# Patient Record
Sex: MALE | Race: ASIAN | NOT HISPANIC OR LATINO | Employment: OTHER | URBAN - METROPOLITAN AREA
[De-identification: names, ages, dates, MRNs, and addresses within clinical notes are randomized per-mention and may not be internally consistent; named-entity substitution may affect disease eponyms.]

---

## 2017-09-25 ENCOUNTER — OFFICE VISIT (OUTPATIENT)
Dept: RHEUMATOLOGY | Facility: CLINIC | Age: 37
End: 2017-09-25
Payer: COMMERCIAL

## 2017-09-25 VITALS
DIASTOLIC BLOOD PRESSURE: 70 MMHG | SYSTOLIC BLOOD PRESSURE: 116 MMHG | WEIGHT: 159.31 LBS | BODY MASS INDEX: 22.86 KG/M2

## 2017-09-25 DIAGNOSIS — M45.5 ANKYLOSING SPONDYLITIS OF THORACOLUMBAR REGION: Primary | ICD-10-CM

## 2017-09-25 PROCEDURE — 99212 OFFICE O/P EST SF 10 MIN: CPT | Mod: ,,, | Performed by: INTERNAL MEDICINE

## 2017-09-25 PROCEDURE — 3008F BODY MASS INDEX DOCD: CPT | Mod: ,,, | Performed by: INTERNAL MEDICINE

## 2017-09-25 NOTE — PROGRESS NOTES
[facility] RHEUMATOLOGY           Follow-up visit      Subjective:       Patient ID:   NAME: Rosalinda Rojo : 1980     37 y.o. male    Referring Doc: No ref. provider found  Other Physicians:    Chief Complaint:  ankylosing spondylitis      HPI/Interval History:   The patient is doing well. He has no new complaints. He has recently run out of Enbrel but has not yet noted any resurgence of his prior symptoms.          ROS:   GEN: no fever, night sweats or weight loss  SKIN:  no rashes , erythema, bruising, or swelling, no Raynauds, no photosensitivity  HEENT: no HAs, no changes in vision, no mouth ulcers, no sicca symptoms, no scalp tenderness, jaw claudication.  CV: no CP, SOB, PND, PALACIOS or orthopnea,no palpitations  PULM: no SOB, cough, hemoptysis, sputum or pleuritic pain  GI: no abdominal pain, nausea, vomiting, constipation, diarrhea, melanotic stools, BRBPR, or hematemesis.no dysphagia  : no hematuria, dysuria  NEURO:no paresthesias, headaches, visual disturbances, muscle weakness  MUSCULOSKELETAL:  No complaints of joint swelling or pain. Some subtly increased neck stiffness  PSYCH: No insomnia, no significant depression, no anxiety    Medications:    Current Outpatient Prescriptions:     etanercept (ENBREL) 50 mg/mL (0.98 mL) Syrg injection, Inject 1 mL (50 mg total) into the skin once a week., Disp: 12 mL, Rfl: 3  FAMILY HISTORY: negative for Connective Tissue Disease        Review of patient's allergies indicates:  No Known Allergies          Objective:     Vitals:  Blood pressure 116/70, weight 72.3 kg (159 lb 4.8 oz).    Physical Examination:   GEN: wn/wd male in no apparent distress; AAOx3  SKIN: no rashes, no lesions, no sclerodactyly, no Raynaud's, no periungual erythema  HEAD: no alopecia, no scalp tenderness, no temporal artery tenderness or induration.  EYES: no pallor, no icterus, PERRLA  ENT:  no thrush, no mucosal dryness or ulcerations, adequate oral hygiene & dentition.  NECK: supple  x 6, no masses, no thyromegaly, no lymphadenopathy.  CV:   S1 and S2 regular, no murmurs, gallop or rubs  CHEST: Normal respiratory effort;  normal breath sounds/no adventitious sounds. No signs of consolidation.  ABD: non-tender and non-distended; soft; normal bowel sounds; no rebound/guarding or tenderness. No hepatosplenomegaly.  Musculoskeletal:  No evidence of inflammatory arthritis or any degenerative disease  EXTREM: no clubbing, cyanosis or edema. normal pulses.  NEURO: grossly intact; motor/sensory WNL; AAOx3; no tremors  PSYCH: normal mood, affect and behavior            Labs:   Lab Results   Component Value Date    WBC 4.72 07/25/2017    HGB 14.4 07/25/2017    HCT 44.4 07/25/2017    MCV 94 07/25/2017     07/25/2017   CMP@  Sodium   Date Value Ref Range Status   07/25/2017 139 136 - 145 mmol/L Final     Potassium   Date Value Ref Range Status   07/25/2017 4.1 3.5 - 5.1 mmol/L Final     Chloride   Date Value Ref Range Status   07/25/2017 102 95 - 110 mmol/L Final     CO2   Date Value Ref Range Status   07/25/2017 28 22 - 31 mmol/L Final     Glucose   Date Value Ref Range Status   07/25/2017 101 70 - 110 mg/dL Final     Comment:     The ADA recommends the following guidelines for fasting glucose:  Normal:       less than 100 mg/dL  Prediabetes:  100 mg/dL to 125 mg/dL  Diabetes:     126 mg/dL or higher       BUN, Bld   Date Value Ref Range Status   07/25/2017 23 (H) 9 - 21 mg/dL Final     Creatinine   Date Value Ref Range Status   07/25/2017 0.79 0.50 - 1.40 mg/dL Final     Calcium   Date Value Ref Range Status   07/25/2017 9.3 8.4 - 10.2 mg/dL Final     Total Protein   Date Value Ref Range Status   07/25/2017 7.3 6.0 - 8.4 g/dL Final     Albumin   Date Value Ref Range Status   07/25/2017 4.3 3.5 - 5.2 g/dL Final     Total Bilirubin   Date Value Ref Range Status   07/25/2017 0.9 0.2 - 1.3 mg/dL Final     Comment:     For infants and newborns, interpretation of results should be based  on gestational  age, weight and in agreement with clinical  observations.  Premature Infant recommended reference ranges:  Up to 24 hours.............<8.0 mg/dL  Up to 48 hours............<12.0 mg/dL  3-5 days..................<15.0 mg/dL  6-29 days.................<15.0 mg/dL       Alkaline Phosphatase   Date Value Ref Range Status   07/25/2017 39 38 - 145 U/L Final     AST   Date Value Ref Range Status   07/25/2017 22 17 - 59 U/L Final     ALT   Date Value Ref Range Status   07/25/2017 24 10 - 44 U/L Final         Radiology/Diagnostic Studies:    none    Assessment/Discussion/Plan:   37 y.o. male with ankylosing spondylitis-excellent control on Enbrel.        PLAN:   I will continue his medication without change. Labs will be obtained prior to the next visit.      RTC:   I will see him in 4 months.      Electronically signed by Bartolo Lara MD

## 2018-01-11 ENCOUNTER — OFFICE VISIT (OUTPATIENT)
Dept: RHEUMATOLOGY | Facility: CLINIC | Age: 38
End: 2018-01-11

## 2018-01-11 VITALS
DIASTOLIC BLOOD PRESSURE: 80 MMHG | WEIGHT: 162.88 LBS | SYSTOLIC BLOOD PRESSURE: 110 MMHG | BODY MASS INDEX: 23.32 KG/M2 | HEART RATE: 76 BPM | HEIGHT: 70 IN

## 2018-01-11 DIAGNOSIS — M75.51 SUBACROMIAL BURSITIS OF RIGHT SHOULDER JOINT: Primary | ICD-10-CM

## 2018-01-11 DIAGNOSIS — M45.6 ANKYLOSING SPONDYLITIS LUMBAR REGION: ICD-10-CM

## 2018-01-11 DIAGNOSIS — Z79.899 ENCOUNTER FOR LONG-TERM (CURRENT) DRUG USE: ICD-10-CM

## 2018-01-11 PROCEDURE — 99213 OFFICE O/P EST LOW 20 MIN: CPT | Mod: 25,,, | Performed by: INTERNAL MEDICINE

## 2018-01-11 PROCEDURE — 20610 DRAIN/INJ JOINT/BURSA W/O US: CPT | Mod: RT,,, | Performed by: INTERNAL MEDICINE

## 2018-01-11 RX ORDER — TRIAMCINOLONE ACETONIDE 40 MG/ML
40 INJECTION, SUSPENSION INTRA-ARTICULAR; INTRAMUSCULAR
Status: DISCONTINUED | OUTPATIENT
Start: 2018-01-11 | End: 2018-01-11 | Stop reason: HOSPADM

## 2018-01-11 RX ORDER — DEXAMETHASONE SODIUM PHOSPHATE 4 MG/ML
2 INJECTION, SOLUTION INTRA-ARTICULAR; INTRALESIONAL; INTRAMUSCULAR; INTRAVENOUS; SOFT TISSUE
Status: DISCONTINUED | OUTPATIENT
Start: 2018-01-11 | End: 2018-01-11 | Stop reason: HOSPADM

## 2018-01-11 RX ADMIN — DEXAMETHASONE SODIUM PHOSPHATE 2 MG: 4 INJECTION, SOLUTION INTRA-ARTICULAR; INTRALESIONAL; INTRAMUSCULAR; INTRAVENOUS; SOFT TISSUE at 04:01

## 2018-01-11 RX ADMIN — TRIAMCINOLONE ACETONIDE 40 MG: 40 INJECTION, SUSPENSION INTRA-ARTICULAR; INTRAMUSCULAR at 04:01

## 2018-01-11 NOTE — PROGRESS NOTES
"      [facility] RHEUMATOLOGY           Follow-up visit      Subjective:       Patient ID:   NAME: Rosalinda Rojo : 1980     37 y.o. male    Referring Doc: No ref. provider found  Other Physicians:    Chief Complaint:  Follow-up (follow up medications )      HPI/Interval History:    The patient has been doing reasonably well. He has had no problems with back pain or range of motion. He has had shoulder pain for the last couple of months. 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          ROS:   GEN:    no fever, night sweats or weight loss  SKIN:   no rashes , erythema, bruising, or swelling, no Raynauds, no photosensitivity  HEENT: no HAs, no changes in vision, no mouth ulcers, no sicca symptoms, no scalp tenderness, jaw claudication.  CV:      no CP, SOB, PND, PALACIOS or orthopnea,no palpitations  PULM: no SOB, cough, hemoptysis, sputum or pleuritic pain  GI:      no abdominal pain, nausea, vomiting, constipation, diarrhea, melanotic stools, BRBPR, or hematemesis, no dysphagia, no GERD  :     no hematuria, dysuria  NEURO: no paresthesias, headaches, visual disturbances  MUSCULOSKELETAL:  Shoulder pain and stiffness without warmth or swelling  PSYCH:   No insomnia, no significant depression, no anxiety    Medications:    Current Outpatient Prescriptions:     etanercept (ENBREL) 50 mg/mL (0.98 mL) Syrg injection, Inject 1 mL (50 mg total) into the skin once a week., Disp: 12 mL, Rfl: 3      FAMILY HISTORY: negative for Connective Tissue Disease        Review of patient's allergies indicates:  No Known Allergies          Objective:     Vitals:  Blood pressure 110/80, pulse 76, height 5' 10" (1.778 m), weight 73.9 kg (162 lb 14.4 oz).    Physical Examination:   GEN: wn/wd male in no apparent distress  SKIN: no rashes, no lesions, no sclerodactyly, no Raynaud's, no periungual erythema  HEAD: no alopecia, no scalp tenderness, no " temporal artery tenderness or induration.  EYES: no pallor, no icterus, PERRLA  ENT:  no thrush, no mucosal dryness or ulcerations, adequate oral hygiene & dentition.  NECK: supple x 6, no masses, no thyromegaly, no lymphadenopathy.  CV:   S1 and S2 regular, no murmurs, gallop or rubs  CHEST: Normal respiratory effort;  normal breath sounds/no adventitious sounds. No signs of consolidation.  ABD: non-tender and non-distended; soft; normal bowel sounds; no rebound/guarding or tenderness. No hepatosplenomegaly.  Musculoskeletal:  Tenderness overlying the right subacromial bursa. Range of motion is full. The drop arm sign is negative  EXTREM: no clubbing, cyanosis or edema. normal pulses.  NEURO: grossly intact; motor/sensory WNL; AAOx3; no tremors  PSYCH:  normal mood, affect and behavior            Labs:   Lab Results   Component Value Date    WBC 4.72 07/25/2017    HGB 14.4 07/25/2017    HCT 44.4 07/25/2017    MCV 94 07/25/2017     07/25/2017   CMP@  Sodium   Date Value Ref Range Status   07/25/2017 139 136 - 145 mmol/L Final     Potassium   Date Value Ref Range Status   07/25/2017 4.1 3.5 - 5.1 mmol/L Final     Chloride   Date Value Ref Range Status   07/25/2017 102 95 - 110 mmol/L Final     CO2   Date Value Ref Range Status   07/25/2017 28 22 - 31 mmol/L Final     Glucose   Date Value Ref Range Status   07/25/2017 101 70 - 110 mg/dL Final     Comment:     The ADA recommends the following guidelines for fasting glucose:  Normal:       less than 100 mg/dL  Prediabetes:  100 mg/dL to 125 mg/dL  Diabetes:     126 mg/dL or higher       BUN, Bld   Date Value Ref Range Status   07/25/2017 23 (H) 9 - 21 mg/dL Final     Creatinine   Date Value Ref Range Status   07/25/2017 0.79 0.50 - 1.40 mg/dL Final     Calcium   Date Value Ref Range Status   07/25/2017 9.3 8.4 - 10.2 mg/dL Final     Total Protein   Date Value Ref Range Status   07/25/2017 7.3 6.0 - 8.4 g/dL Final     Albumin   Date Value Ref Range Status    07/25/2017 4.3 3.5 - 5.2 g/dL Final     Total Bilirubin   Date Value Ref Range Status   07/25/2017 0.9 0.2 - 1.3 mg/dL Final     Comment:     For infants and newborns, interpretation of results should be based  on gestational age, weight and in agreement with clinical  observations.  Premature Infant recommended reference ranges:  Up to 24 hours.............<8.0 mg/dL  Up to 48 hours............<12.0 mg/dL  3-5 days..................<15.0 mg/dL  6-29 days.................<15.0 mg/dL       Alkaline Phosphatase   Date Value Ref Range Status   07/25/2017 39 38 - 145 U/L Final     AST   Date Value Ref Range Status   07/25/2017 22 17 - 59 U/L Final     ALT   Date Value Ref Range Status   07/25/2017 24 10 - 44 U/L Final         Radiology/Diagnostic Studies:    none    Assessment/Discussion/Plan:   37 y.o. male with ankylosing spondylitis-excellent response to Enbrel.  (2) RIGHT SUBACROMIAL BURSITIS    PLAN:  At the patient's request, the right shoulder was injected as described on the appended procedure note. He tolerated injection well and had pain-free range of motion immediately thereafter.  Routine blood testing was ordered. The patient will have it done in [place].    RTC:  I will see him back in 4 months.      Electronically signed by Bartolo Lara MD

## 2018-01-11 NOTE — PROCEDURES
Large Joint Aspiration/Injection  Date/Time: 1/11/2018 4:36 PM  Performed by: BREANNA MOREIRA  Authorized by: BREANNA MOREIRA     Consent Done?:  Yes (Verbal)  Indications:  Pain  Procedure site marked: Yes    Timeout: Prior to procedure the correct patient, procedure, and site was verified      Location:  Shoulder  Site:  R subacromial bursa  Prep: Patient was prepped and draped in usual sterile fashion    Ultrasonic Guidance for needle placement: No  Needle size:  22 G  Approach:  Lateral  Medications:  40 mg triamcinolone acetonide 40 mg/mL; 2 mg dexamethasone 4 mg/mL  Aspirate amount (ml):  0  Patient tolerance:  Patient tolerated the procedure well with no immediate complications

## 2018-05-10 ENCOUNTER — OFFICE VISIT (OUTPATIENT)
Dept: RHEUMATOLOGY | Facility: CLINIC | Age: 38
End: 2018-05-10

## 2018-05-10 VITALS — SYSTOLIC BLOOD PRESSURE: 98 MMHG | BODY MASS INDEX: 22.7 KG/M2 | DIASTOLIC BLOOD PRESSURE: 68 MMHG | WEIGHT: 158.19 LBS

## 2018-05-10 DIAGNOSIS — Z79.899 ENCOUNTER FOR LONG-TERM (CURRENT) DRUG USE: ICD-10-CM

## 2018-05-10 DIAGNOSIS — M45.6 ANKYLOSING SPONDYLITIS LUMBAR REGION: ICD-10-CM

## 2018-05-10 DIAGNOSIS — M75.51 SUBACROMIAL BURSITIS OF RIGHT SHOULDER JOINT: Primary | ICD-10-CM

## 2018-05-10 PROCEDURE — 99213 OFFICE O/P EST LOW 20 MIN: CPT | Mod: ,,, | Performed by: INTERNAL MEDICINE

## 2018-05-10 NOTE — PROGRESS NOTES
[facility] RHEUMATOLOGY           Follow-up visit      Subjective:       Patient ID:   NAME: Rosalinda Rojo : 1980     37 y.o. male    Referring Doc: No ref. provider found  Other Physicians:    Chief Complaint:  Ankylosing spondylitis (Takes Enbrel Inj q weekly)      HPI/Interval History:   The patient is having no problem with back pain or stiffness. He has no peripheral joint swelling. He continues however to have very significant shoulder pain. He has been evaluated by orthopedics and [facility] on 2 occasions. Plain films were performed. No definitive diagnosis has been offered. Cortisone injections were not helpful. 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  CV:      no CP, SOB, PND, PALACIOS or orthopnea,no palpitations  PULM: no SOB, cough, hemoptysis, sputum or pleuritic pain  GI:      no abdominal pain, nausea, vomiting, constipation, diarrhea, melanotic stools, BRBPR, or hematemesis, no dysphagia, no GERD  :     no hematuria, dysuria  NEURO: no paresthesias, headaches, visual disturbances  MUSCULOSKELETAL:  Bilateral shoulder pain as above  PSYCH:   No insomnia, no significant depression, no anxiety    Medications:    Current Outpatient Prescriptions:     etanercept (ENBREL) 50 mg/mL (0.98 mL) Syrg injection, Inject 1 mL (50 mg total) into the skin once a week., Disp: 12 mL, Rfl: 3      FAMILY HISTORY: negative for Connective Tissue Disease        Review of patient's allergies indicates:  No Known Allergies          Objective:     Vitals:  Blood pressure 98/68, weight 71.8 kg (158 lb 3.2 oz).    Physical Examination:   GEN: wn/wd male in no  apparent distress  SKIN: no rashes, no lesions, no sclerodactyly, no Raynaud's, no periungual erythema  HEAD: no alopecia, no scalp tenderness, no temporal artery tenderness or induration.  EYES: no pallor, no icterus, PERRLA  ENT:  no thrush, no mucosal dryness or ulcerations, adequate oral hygiene & dentition.  NECK: supple x 6, no masses, no thyromegaly, no lymphadenopathy.  CV:   S1 and S2 regular, no murmurs, gallop or rubs  CHEST: Normal respiratory effort;  normal breath sounds/no adventitious sounds. No signs of consolidation.  ABD: non-tender and non-distended; soft; normal bowel sounds; no rebound/guarding or tenderness. No hepatosplenomegaly.  Musculoskeletal:  Crepitus throughout the arc of motion in both shoulders without any evidence of instability or inflammation. Range of motion is full. The impingement sign is negative  EXTREM: no clubbing, cyanosis or edema. normal pulses.  NEURO: grossly intact; motor/sensory WNL; AAOx3; no tremors  PSYCH:  normal mood, affect and behavior            Labs:   Lab Results   Component Value Date    WBC 4.72 07/25/2017    HGB 14.4 07/25/2017    HCT 44.4 07/25/2017    MCV 94 07/25/2017     07/25/2017   CMP@  Sodium   Date Value Ref Range Status   07/25/2017 139 136 - 145 mmol/L Final     Potassium   Date Value Ref Range Status   07/25/2017 4.1 3.5 - 5.1 mmol/L Final     Chloride   Date Value Ref Range Status   07/25/2017 102 95 - 110 mmol/L Final     CO2   Date Value Ref Range Status   07/25/2017 28 22 - 31 mmol/L Final     Glucose   Date Value Ref Range Status   07/25/2017 101 70 - 110 mg/dL Final     Comment:     The ADA recommends the following guidelines for fasting glucose:  Normal:       less than 100 mg/dL  Prediabetes:  100 mg/dL to 125 mg/dL  Diabetes:     126 mg/dL or higher       BUN, Bld   Date Value Ref Range Status   07/25/2017 23 (H) 9 - 21 mg/dL Final     Creatinine   Date Value Ref Range Status   07/25/2017 0.79 0.50 - 1.40 mg/dL Final      Calcium   Date Value Ref Range Status   07/25/2017 9.3 8.4 - 10.2 mg/dL Final     Total Protein   Date Value Ref Range Status   07/25/2017 7.3 6.0 - 8.4 g/dL Final     Albumin   Date Value Ref Range Status   07/25/2017 4.3 3.5 - 5.2 g/dL Final     Total Bilirubin   Date Value Ref Range Status   07/25/2017 0.9 0.2 - 1.3 mg/dL Final     Comment:     For infants and newborns, interpretation of results should be based  on gestational age, weight and in agreement with clinical  observations.  Premature Infant recommended reference ranges:  Up to 24 hours.............<8.0 mg/dL  Up to 48 hours............<12.0 mg/dL  3-5 days..................<15.0 mg/dL  6-29 days.................<15.0 mg/dL       Alkaline Phosphatase   Date Value Ref Range Status   07/25/2017 39 38 - 145 U/L Final     AST   Date Value Ref Range Status   07/25/2017 22 17 - 59 U/L Final     ALT   Date Value Ref Range Status   07/25/2017 24 10 - 44 U/L Final         Radiology/Diagnostic Studies:    none    Assessment/Discussion/Plan:   37 y.o. male with ankylosing spondylitis with significant shoulder pain bilaterally not likely related to the primary diagnosis      PLAN:  I will continue his Enbrel without change. I have discussed the working diagnosis of internal derangement of the shoulders with him. It is my opinion that the next step should be obtaining an MRI of the shoulders. He is in agreement and will actively seek out the best price possible. I will write the order for him and then we will reconvene and discuss the options.He understands that the options may be limited to surgery.      RTC:  I will schedule his regular appointment in 6 months.      Electronically signed by Bartolo Lara MD

## 2018-11-08 ENCOUNTER — OFFICE VISIT (OUTPATIENT)
Dept: RHEUMATOLOGY | Facility: CLINIC | Age: 38
End: 2018-11-08

## 2018-11-08 VITALS
WEIGHT: 168 LBS | HEART RATE: 75 BPM | BODY MASS INDEX: 24.11 KG/M2 | DIASTOLIC BLOOD PRESSURE: 65 MMHG | SYSTOLIC BLOOD PRESSURE: 109 MMHG

## 2018-11-08 DIAGNOSIS — M45.6 ANKYLOSING SPONDYLITIS LUMBAR REGION: Primary | ICD-10-CM

## 2018-11-08 DIAGNOSIS — Z79.899 ENCOUNTER FOR LONG-TERM (CURRENT) DRUG USE: ICD-10-CM

## 2018-11-08 PROCEDURE — 99213 OFFICE O/P EST LOW 20 MIN: CPT | Mod: ,,, | Performed by: INTERNAL MEDICINE

## 2018-11-08 NOTE — PROGRESS NOTES
[facility] RHEUMATOLOGY           Follow-up visit    Notes dictated via Dragon to EPIC. Please forgive any unintentional errors.  Subjective:       Patient ID:   NAME: Rosalinda Rojo : 1980     38 y.o. male    Referring Doc: No ref. provider found  Other Physicians:    Chief Complaint:  Ankylosing spondylitis      HPI/Interval History:  The patient is doing well. He has no progressive back stiffness or pain. He has not noted any decreased range of motion. He continues to have bilateral shoulder pain and has been told by orthopedics that he needs rotator cuff repair.          ROS:   GEN:    no fever, night sweats or weight loss  SKIN:   no rashes , erythema, bruising, or swelling, no Raynauds, no photosensitivity  HEENT: no changes in vision, no mouth ulcers, no sicca symptoms, no scalp tenderness, no jaw claudication.  CV:      no CP, PND, PALACIOS or orthopnea, no palpitations  PULM: no SOB, cough, hemoptysis, sputum or pleuritic pain  GI:       no abdominal pain, nausea, vomiting, constipation, diarrhea, melanotic stools, BRBPR, or hematemesis, no dysphagia, no GERD  :     no hematuria, dysuria  NEURO: no paresthesias, headaches, acute visual disturbances  MUSCULOSKELETAL:  No back pain or stiffness. No complaints of peripheral joint inflammation  PSYCH:   No insomnia, no significant anxiety or depression    Medications:    Current Outpatient Medications:     etanercept (ENBREL) 50 mg/mL (0.98 mL) Syrg injection, Inject 1 mL (50 mg total) into the skin once a week., Disp: 12 mL, Rfl: 3      FAMILY HISTORY: negative for Connective Tissue Disease        Review of patient's allergies indicates:  No Known Allergies          Objective:     Vitals:  Blood pressure 109/65, pulse 75, weight 76.2 kg (168 lb).    Physical Examination:   GEN: wn/wd male in no apparent distress  SKIN: no rashes, no lesions, no sclerodactyly, no Raynaud's, no periungual erythema  HEAD: no alopecia, no scalp tenderness, no temporal artery  tenderness or induration.  EYES: no pallor, no icterus, PERRLA  ENT:  no thrush, no mucosal dryness or ulcerations, adequate oral hygiene & dentition.  NECK: supple x 6, no masses, no thyromegaly, no lymphadenopathy.  CV:   S1 and S2 regular, no murmurs, gallop or rubs  CHEST: Normal respiratory effort;  normal breath sounds/no adventitious sounds. No signs of consolidation.  ABD: non-tender and non-distended; soft; normal bowel sounds; no rebound/guarding or tenderness. No hepatosplenomegaly.  Musculoskeletal:  No evidence of active inflammatory disease. Some weakness is noted on abduction of the right shoulder but active range of motion is full and the drop arm and impingement signs are negative  EXTREM: no clubbing, cyanosis or edema. normal pulses.  NEURO: grossly intact; motor/sensory WNL; no tremors  PSYCH:  normal mood, affect and behavior            Labs:   Lab Results   Component Value Date    WBC 4.72 07/25/2017    HGB 14.4 07/25/2017    HCT 44.4 07/25/2017    MCV 94 07/25/2017     07/25/2017   CMP@  Sodium   Date Value Ref Range Status   07/25/2017 139 136 - 145 mmol/L Final     Potassium   Date Value Ref Range Status   07/25/2017 4.1 3.5 - 5.1 mmol/L Final     Chloride   Date Value Ref Range Status   07/25/2017 102 95 - 110 mmol/L Final     CO2   Date Value Ref Range Status   07/25/2017 28 22 - 31 mmol/L Final     Glucose   Date Value Ref Range Status   07/25/2017 101 70 - 110 mg/dL Final     Comment:     The ADA recommends the following guidelines for fasting glucose:  Normal:       less than 100 mg/dL  Prediabetes:  100 mg/dL to 125 mg/dL  Diabetes:     126 mg/dL or higher       BUN, Bld   Date Value Ref Range Status   07/25/2017 23 (H) 9 - 21 mg/dL Final     Creatinine   Date Value Ref Range Status   07/25/2017 0.79 0.50 - 1.40 mg/dL Final     Calcium   Date Value Ref Range Status   07/25/2017 9.3 8.4 - 10.2 mg/dL Final     Total Protein   Date Value Ref Range Status   07/25/2017 7.3 6.0 - 8.4  g/dL Final     Albumin   Date Value Ref Range Status   07/25/2017 4.3 3.5 - 5.2 g/dL Final     Total Bilirubin   Date Value Ref Range Status   07/25/2017 0.9 0.2 - 1.3 mg/dL Final     Comment:     For infants and newborns, interpretation of results should be based  on gestational age, weight and in agreement with clinical  observations.  Premature Infant recommended reference ranges:  Up to 24 hours.............<8.0 mg/dL  Up to 48 hours............<12.0 mg/dL  3-5 days..................<15.0 mg/dL  6-29 days.................<15.0 mg/dL       Alkaline Phosphatase   Date Value Ref Range Status   07/25/2017 39 38 - 145 U/L Final     AST   Date Value Ref Range Status   07/25/2017 22 17 - 59 U/L Final     ALT   Date Value Ref Range Status   07/25/2017 24 10 - 44 U/L Final         Radiology/Diagnostic Studies:    none    Assessment/Discussion/Plan:   38 y.o. male with ankylosing spondylitis-excellent response to Enbrel      PLAN:  I will continue his medication without change. Blood testing was ordered and will be obtained in [place] prior to the end of this year.      RTC:  I will see him back in 4 months      Electronically signed by Bartolo Lara MD

## 2019-03-19 ENCOUNTER — OFFICE VISIT (OUTPATIENT)
Dept: RHEUMATOLOGY | Facility: CLINIC | Age: 39
End: 2019-03-19

## 2019-03-19 VITALS
WEIGHT: 161.88 LBS | SYSTOLIC BLOOD PRESSURE: 109 MMHG | DIASTOLIC BLOOD PRESSURE: 67 MMHG | BODY MASS INDEX: 23.23 KG/M2

## 2019-03-19 DIAGNOSIS — M45.9 ANKYLOSING SPONDYLITIS, UNSPECIFIED SITE OF SPINE: Primary | ICD-10-CM

## 2019-03-19 PROCEDURE — 99213 OFFICE O/P EST LOW 20 MIN: CPT | Mod: ,,, | Performed by: INTERNAL MEDICINE

## 2019-03-19 PROCEDURE — 99213 PR OFFICE/OUTPT VISIT, EST, LEVL III, 20-29 MIN: ICD-10-PCS | Mod: ,,, | Performed by: INTERNAL MEDICINE

## 2019-03-19 NOTE — PROGRESS NOTES
[facility]HEUMATOLOG[facility]           Follow-up visit    Notes dictated via Dragon to EPIC. Please forgive any unintended errors.  Subjective:       Patient ID:   NAME: Rosalinda Rojo : 1980     38 y.o. male    Referring Doc: No ref. provider found  Other Physicians:    Chief Complaint:  Ankylosing spondylitis lumbar region      HPI/Interval History:   the patient has been doing well. He has had no problems with back pain or stiffness. He is working 7 days a week and tolerating being on his feet almost all day long.          ROS:   GEN:    no fever, night sweats or weight loss  SKIN:   no rashes , erythema, bruising, or swelling, no Raynauds, no photosensitivity  HEENT: no changes in vision, no mouth ulcers, no sicca symptoms, no scalp tenderness, no jaw claudication.  CV:      no CP, PND, PALACIOS or orthopnea, no palpitations  PULM: no SOB, cough, hemoptysis, sputum or pleuritic pain  GI:       no GERD, no dysphagia, no abdominal pain, nausea, vomiting, constipation, diarrhea, melanotic stools, BRBPR, or hematemesis  :      no hematuria, dysuria  NEURO: no paresthesias, headaches, acute visual disturbances  MUSCULOSKELETAL:  No muscle or joint complaints  PSYCH:   No insomnia, no increased anxiety or depression    Medications:    Current Outpatient Medications:     etanercept (ENBREL) 50 mg/mL (0.98 mL) Syrg injection, Inject 1 mL (50 mg total) into the skin once a week., Disp: 12 mL, Rfl: 3      FAMILY HISTORY: negative for Connective Tissue Disease        Review of patient's allergies indicates:  No Known Allergies          Objective:     Vitals:  Blood pressure 109/67, weight 73.4 kg (161 lb 14.4 oz).    Physical Examination:   GEN: wn/wd male in no apparent distress  SKIN: no rashes, no sclerodactyly, no Raynaud's, no periungual erythema, no digital tip ulcerations, no nailbed pitting  HEAD: no alopecia, no scalp tenderness, no temporal artery tenderness or induration.  EYES: no pallor, no icterus,  PERRLA  ENT:  no thrush, no mucosal dryness or ulcerations, adequate oral hygiene & dentition.  NECK: supple x 6, no masses, no thyromegaly, no lymphadenopathy.  CV:   S1 and S2 regular, no murmurs, gallop or rubs  CHEST: Normal respiratory effort;  normal breath sounds/no adventitious sounds. No signs of consolidation.  ABD: non-tender and non-distended; soft; normal bowel sounds; no rebound/guarding or tenderness. No hepatosplenomegaly.  Musculoskeletal:  No decrease in range of motion of the back. No signs of peripheral arthritis  EXTREM: no clubbing, cyanosis or edema. normal pulses.  NEURO:  grossly intact; motor/sensory WNL; no tremors  PSYCH:  normal mood, affect and behavior            Labs:   Lab Results   Component Value Date    WBC 4.72 07/25/2017    HGB 14.4 07/25/2017    HCT 44.4 07/25/2017    MCV 94 07/25/2017     07/25/2017   CMP@  Sodium   Date Value Ref Range Status   07/25/2017 139 136 - 145 mmol/L Final     Potassium   Date Value Ref Range Status   07/25/2017 4.1 3.5 - 5.1 mmol/L Final     Chloride   Date Value Ref Range Status   07/25/2017 102 95 - 110 mmol/L Final     CO2   Date Value Ref Range Status   07/25/2017 28 22 - 31 mmol/L Final     Glucose   Date Value Ref Range Status   07/25/2017 101 70 - 110 mg/dL Final     Comment:     The ADA recommends the following guidelines for fasting glucose:  Normal:       less than 100 mg/dL  Prediabetes:  100 mg/dL to 125 mg/dL  Diabetes:     126 mg/dL or higher       BUN, Bld   Date Value Ref Range Status   07/25/2017 23 (H) 9 - 21 mg/dL Final     Creatinine   Date Value Ref Range Status   07/25/2017 0.79 0.50 - 1.40 mg/dL Final     Calcium   Date Value Ref Range Status   07/25/2017 9.3 8.4 - 10.2 mg/dL Final     Total Protein   Date Value Ref Range Status   07/25/2017 7.3 6.0 - 8.4 g/dL Final     Albumin   Date Value Ref Range Status   07/25/2017 4.3 3.5 - 5.2 g/dL Final     Total Bilirubin   Date Value Ref Range Status   07/25/2017 0.9 0.2 - 1.3  mg/dL Final     Comment:     For infants and newborns, interpretation of results should be based  on gestational age, weight and in agreement with clinical  observations.  Premature Infant recommended reference ranges:  Up to 24 hours.............<8.0 mg/dL  Up to 48 hours............<12.0 mg/dL  3-5 days..................<15.0 mg/dL  6-29 days.................<15.0 mg/dL       Alkaline Phosphatase   Date Value Ref Range Status   07/25/2017 39 38 - 145 U/L Final     AST   Date Value Ref Range Status   07/25/2017 22 17 - 59 U/L Final     ALT   Date Value Ref Range Status   07/25/2017 24 10 - 44 U/L Final         Radiology/Diagnostic Studies:    None    Assessment/Discussion/Plan:   38 y.o. male with ankylosing spondylitis-excellent control on Enbrel      PLAN:  I will continue his medication unchanged. Blood testing from November 2018 was reviewed.      RTC: I will see him back in 6 months with new labs      Electronically signed by Bartolo Lara MD

## 2019-10-03 ENCOUNTER — OFFICE VISIT (OUTPATIENT)
Dept: RHEUMATOLOGY | Facility: CLINIC | Age: 39
End: 2019-10-03

## 2019-10-03 VITALS
SYSTOLIC BLOOD PRESSURE: 114 MMHG | DIASTOLIC BLOOD PRESSURE: 74 MMHG | WEIGHT: 164.19 LBS | BODY MASS INDEX: 23.56 KG/M2

## 2019-10-03 DIAGNOSIS — Z79.899 ENCOUNTER FOR LONG-TERM (CURRENT) DRUG USE: ICD-10-CM

## 2019-10-03 DIAGNOSIS — M45.9 ANKYLOSING SPONDYLITIS, UNSPECIFIED SITE OF SPINE: Primary | ICD-10-CM

## 2019-10-03 PROCEDURE — 99213 OFFICE O/P EST LOW 20 MIN: CPT | Mod: S$GLB,,, | Performed by: INTERNAL MEDICINE

## 2019-10-03 PROCEDURE — 99213 PR OFFICE/OUTPT VISIT, EST, LEVL III, 20-29 MIN: ICD-10-PCS | Mod: S$GLB,,, | Performed by: INTERNAL MEDICINE

## 2019-10-03 NOTE — PROGRESS NOTES
[facility]HEUMATOLOG[facility]           Follow-up visit    Notes dictated to M*Modal. Please forgive any unintended errors.  Subjective:       Patient ID:   NAME: Rosalinda Rojo : 1980     39 y.o. male    Referring Doc: No ref. provider found  Other Physicians:    Chief Complaint:  Ankylosing spondylitis      HPI/Interval History:  The patient is doing well.  He has no complaints of back pain or of joint swelling.  He has no morning stiffness.    ROS:   GEN:    no fever, night sweats or weight loss  SKIN:   no rashes, erythema, bruising, or swelling, no Raynauds, no photosensitivity  HEENT: no changes in vision, no mouth ulcers, no sicca symptoms, no scalp tenderness, no jaw claudication.  CV:      no CP, PND, PALACIOS, orthopnea, no palpitations  PULM: no SOB, cough, hemoptysis, sputum or pleuritic pain  GI:       no GERD, no dysphagia, no hematemesis, no abdominal pain, nausea, vomiting, constipation, diarrhea, melanotic stools, BRBPR  :      no hematuria, dysuria  NEURO: no paresthesias, headaches, acute visual disturbances  MUSCULOSKELETAL:  No red, hot, and/or swollen joints  PSYCH:   No increased insomnia, no increased anxiety, no increased depression    Past Medical/Surgical History:  Past Medical History:   Diagnosis Date    Ankylosing spondylitis lumbar region     Ankylosing spondylitis lumbar region     Back pain     Bilateral Shoulder Strain     Wellness Visit 17      History reviewed. No pertinent surgical history.    Allergies:  Review of patient's allergies indicates:  No Known Allergies    Social/Family History:  Social History     Socioeconomic History    Marital status:      Spouse name: Not on file    Number of children: Not on file    Years of education: Not on file    Highest education level: Not on file   Occupational History    Not on file   Social Needs    Financial resource strain: Not on file    Food insecurity:     Worry: Not on file     Inability: Not on file     Transportation needs:     Medical: Not on file     Non-medical: Not on file   Tobacco Use    Smoking status: Never Smoker    Smokeless tobacco: Never Used   Substance and Sexual Activity    Alcohol use: No    Drug use: No    Sexual activity: Yes     Partners: Female   Lifestyle    Physical activity:     Days per week: Not on file     Minutes per session: Not on file    Stress: Not on file   Relationships    Social connections:     Talks on phone: Not on file     Gets together: Not on file     Attends Yazidi service: Not on file     Active member of club or organization: Not on file     Attends meetings of clubs or organizations: Not on file     Relationship status: Not on file   Other Topics Concern    Not on file   Social History Narrative    Not on file     Family History   Problem Relation Age of Onset    Heart disease Father      FAMILY HISTORY: negative for Connective Tissue Disease      Medications:    Current Outpatient Medications:     etanercept (ENBREL) 50 mg/mL (0.98 mL) Syrg injection, Inject 1 mL (50 mg total) into the skin once a week., Disp: 12 mL, Rfl: 3    mupirocin (BACTROBAN) 2 % ointment, Apply topically 3 (three) times daily. (Patient not taking: Reported on 10/3/2019), Disp: 1 Tube, Rfl: 0      Objective:     Vitals:  Blood pressure 114/74, weight 74.5 kg (164 lb 3.2 oz).    Physical Examination:   GEN: wn/wd male in no apparent distress  SKIN: no rashes, no sclerodactyly, no Raynaud's, no periungual erythema, no digital tip ulcerations, no nailbed pitting  HEAD: no alopecia, no scalp tenderness, no temporal artery tenderness or induration.  EYES: no pallor, no icterus, PERRLA  ENT:  no thrush, no mucosal dryness or ulcerations, adequate oral hygiene & dentition.  NECK: supple x 6, no masses, no thyromegaly, no lymphadenopathy.  CV:   S1 and S2 regular, no murmurs, gallop or rubs  CHEST: Normal respiratory effort;  normal breath sounds/no adventitious sounds. No signs of  consolidation.  ABD: non-tender and non-distended; soft; normal bowel sounds; no rebound/guarding or tenderness. No hepatosplenomegaly.  Musculoskeletal:  No evidence of active synovitis.  No progressive deformity.  No decrease in range of motion of the back  EXTREM: no clubbing, cyanosis or edema. normal pulses.  NEURO:  grossly intact; motor/sensory WNL; no tremors  PSYCH:  normal mood, affect and behavior    Labs:   Lab Results   Component Value Date    WBC 4.72 07/25/2017    HGB 14.4 07/25/2017    HCT 44.4 07/25/2017    MCV 94 07/25/2017     07/25/2017   CMP@  Sodium   Date Value Ref Range Status   07/25/2017 139 136 - 145 mmol/L Final     Potassium   Date Value Ref Range Status   07/25/2017 4.1 3.5 - 5.1 mmol/L Final     Chloride   Date Value Ref Range Status   07/25/2017 102 95 - 110 mmol/L Final     CO2   Date Value Ref Range Status   07/25/2017 28 22 - 31 mmol/L Final     Glucose   Date Value Ref Range Status   07/25/2017 101 70 - 110 mg/dL Final     Comment:     The ADA recommends the following guidelines for fasting glucose:  Normal:       less than 100 mg/dL  Prediabetes:  100 mg/dL to 125 mg/dL  Diabetes:     126 mg/dL or higher       BUN, Bld   Date Value Ref Range Status   07/25/2017 23 (H) 9 - 21 mg/dL Final     Creatinine   Date Value Ref Range Status   07/25/2017 0.79 0.50 - 1.40 mg/dL Final     Calcium   Date Value Ref Range Status   07/25/2017 9.3 8.4 - 10.2 mg/dL Final     Total Protein   Date Value Ref Range Status   07/25/2017 7.3 6.0 - 8.4 g/dL Final     Albumin   Date Value Ref Range Status   07/25/2017 4.3 3.5 - 5.2 g/dL Final     Total Bilirubin   Date Value Ref Range Status   07/25/2017 0.9 0.2 - 1.3 mg/dL Final     Comment:     For infants and newborns, interpretation of results should be based  on gestational age, weight and in agreement with clinical  observations.  Premature Infant recommended reference ranges:  Up to 24 hours.............<8.0 mg/dL  Up to 48  hours............<12.0 mg/dL  3-5 days..................<15.0 mg/dL  6-29 days.................<15.0 mg/dL       Alkaline Phosphatase   Date Value Ref Range Status   07/25/2017 39 38 - 145 U/L Final     AST   Date Value Ref Range Status   07/25/2017 22 17 - 59 U/L Final     ALT   Date Value Ref Range Status   07/25/2017 24 10 - 44 U/L Final       Radiology/Diagnostic Studies:    None    Assessment/Discussion/Plan:   39 y.o. male with ankylosing spondylitis-good response to Enbrel    PLAN:  I will continue his medication unchanged.  Routine blood testing was ordered.    RTC:  I will see him back in 6 months    Electronically signed by Bartolo Lara MD

## 2020-05-11 ENCOUNTER — OFFICE VISIT (OUTPATIENT)
Dept: RHEUMATOLOGY | Facility: CLINIC | Age: 40
End: 2020-05-11

## 2020-05-11 VITALS
TEMPERATURE: 98 F | DIASTOLIC BLOOD PRESSURE: 67 MMHG | WEIGHT: 164.19 LBS | SYSTOLIC BLOOD PRESSURE: 104 MMHG | BODY MASS INDEX: 23.56 KG/M2

## 2020-05-11 DIAGNOSIS — Z79.899 ENCOUNTER FOR LONG-TERM (CURRENT) DRUG USE: ICD-10-CM

## 2020-05-11 DIAGNOSIS — M45.9 ANKYLOSING SPONDYLITIS, UNSPECIFIED SITE OF SPINE: Primary | ICD-10-CM

## 2020-05-11 PROCEDURE — 99213 OFFICE O/P EST LOW 20 MIN: CPT | Mod: S$GLB,,, | Performed by: INTERNAL MEDICINE

## 2020-05-11 PROCEDURE — 99213 PR OFFICE/OUTPT VISIT, EST, LEVL III, 20-29 MIN: ICD-10-PCS | Mod: S$GLB,,, | Performed by: INTERNAL MEDICINE

## 2020-05-11 NOTE — PROGRESS NOTES
[facility] RHEUMATOLOGY           Follow-up visit    Notes dictated to M*Modal. Please forgive any unintended errors.  Subjective:       Patient ID:   NAME: Rosalinda Rjoo : 1980     39 y.o. male    Referring Doc: No ref. provider found  Other Physicians:    Chief Complaint:  Ankylosing spondylitis      HPI/Interval History:  The patient is doing well.  He has minimal back stiffness each morning.  He has not been as diligent with his range of motion exercises during the pandemic.  He has not had any other peripheral signs or symptoms of his spondyloarthropathy.    ROS:   GEN:    no fever, night sweats or weight loss  SKIN:   no rashes, erythema, bruising, or swelling, no Raynauds, no photosensitivity  HEENT: no changes in vision, no mouth ulcers, no sicca symptoms, no scalp tenderness, no jaw claudication.  CV:      no CP, PND, PALACIOS, orthopnea, no palpitations  PULM: no SOB, cough, hemoptysis, sputum or pleuritic pain  GI:       no GERD, no dysphagia, no hematemesis, no abdominal pain, nausea, vomiting, constipation, diarrhea, melanotic stools, BRBPR  :      no hematuria, dysuria  NEURO: no paresthesias, headaches, acute visual disturbances  MUSCULOSKELETAL:  No red, hot, and/or swollen joints   PSYCH:   No increased insomnia, no increased anxiety, no increased depression    Past Medical/Surgical History:  Past Medical History:   Diagnosis Date    Ankylosing spondylitis lumbar region     Ankylosing spondylitis lumbar region     Back pain     Bilateral Shoulder Strain     Wellness Visit 17      History reviewed. No pertinent surgical history.    Allergies:  Review of patient's allergies indicates:  No Known Allergies    Social/Family History:  Social History     Socioeconomic History    Marital status:      Spouse name: Not on file    Number of children: Not on file    Years of education: Not on file    Highest education level: Not on file   Occupational History    Not on file   Social  Needs    Financial resource strain: Not on file    Food insecurity:     Worry: Not on file     Inability: Not on file    Transportation needs:     Medical: Not on file     Non-medical: Not on file   Tobacco Use    Smoking status: Never Smoker    Smokeless tobacco: Never Used   Substance and Sexual Activity    Alcohol use: No    Drug use: No    Sexual activity: Yes     Partners: Female   Lifestyle    Physical activity:     Days per week: Not on file     Minutes per session: Not on file    Stress: Not on file   Relationships    Social connections:     Talks on phone: Not on file     Gets together: Not on file     Attends [employer] service: Not on file     Active member of club or organization: Not on file     Attends meetings of clubs or organizations: Not on file     Relationship status: Not on file   Other Topics Concern    Not on file   Social History Narrative    Not on file     Family History   Problem Relation Age of Onset    Heart disease Father      FAMILY HISTORY: negative for Connective Tissue Disease      Medications:    Current Outpatient Medications:     etanercept (ENBREL) 50 mg/mL (0.98 mL) Syrg injection, Inject 1 mL (50 mg total) into the skin once a week., Disp: 12 mL, Rfl: 3    mupirocin (BACTROBAN) 2 % ointment, Apply topically 3 (three) times daily. (Patient not taking: Reported on 10/3/2019), Disp: 1 Tube, Rfl: 0      Objective:     Vitals:  Blood pressure 104/67, temperature 97.8 °F (36.6 °C), weight 74.5 kg (164 lb 3.2 oz).    Physical Examination:   GEN: wn/wd male in no apparent distress  SKIN: no rashes, no sclerodactyly, no Raynaud's, no periungual erythema, no digital tip ulcerations, no nailbed pitting  HEAD: no alopecia, no scalp tenderness, no temporal artery tenderness or induration.  EYES: no pallor, no icterus, PERRLA  ENT:  no thrush, no mucosal dryness or ulcerations, adequate oral hygiene & dentition.  NECK: supple x 6, no masses, no thyromegaly, no  lymphadenopathy.  CV:   S1 and S2 regular, no murmurs, gallop or rubs  CHEST: Normal respiratory effort;  normal breath sounds/no adventitious sounds. No signs of consolidation.  ABD: non-tender and non-distended; soft; normal bowel sounds; no rebound/guarding or tenderness. No hepatosplenomegaly.  Musculoskeletal:  No inflammatory changes.  No change in lumbar flexion.  Posture unchanged  EXTREM: no clubbing, cyanosis or edema. normal pulses.  NEURO:  grossly intact; motor/sensory WNL; no tremors  PSYCH:  normal mood, affect and behavior    Labs:   Lab Results   Component Value Date    WBC 4.72 07/25/2017    HGB 14.4 07/25/2017    HCT 44.4 07/25/2017    MCV 94 07/25/2017     07/25/2017   CMP@  Sodium   Date Value Ref Range Status   07/25/2017 139 136 - 145 mmol/L Final     Potassium   Date Value Ref Range Status   07/25/2017 4.1 3.5 - 5.1 mmol/L Final     Chloride   Date Value Ref Range Status   07/25/2017 102 95 - 110 mmol/L Final     CO2   Date Value Ref Range Status   07/25/2017 28 22 - 31 mmol/L Final     Glucose   Date Value Ref Range Status   07/25/2017 101 70 - 110 mg/dL Final     Comment:     The ADA recommends the following guidelines for fasting glucose:  Normal:       less than 100 mg/dL  Prediabetes:  100 mg/dL to 125 mg/dL  Diabetes:     126 mg/dL or higher       BUN, Bld   Date Value Ref Range Status   07/25/2017 23 (H) 9 - 21 mg/dL Final     Creatinine   Date Value Ref Range Status   07/25/2017 0.79 0.50 - 1.40 mg/dL Final     Calcium   Date Value Ref Range Status   07/25/2017 9.3 8.4 - 10.2 mg/dL Final     Total Protein   Date Value Ref Range Status   07/25/2017 7.3 6.0 - 8.4 g/dL Final     Albumin   Date Value Ref Range Status   07/25/2017 4.3 3.5 - 5.2 g/dL Final     Total Bilirubin   Date Value Ref Range Status   07/25/2017 0.9 0.2 - 1.3 mg/dL Final     Comment:     For infants and newborns, interpretation of results should be based  on gestational age, weight and in agreement with  clinical  observations.  Premature Infant recommended reference ranges:  Up to 24 hours.............<8.0 mg/dL  Up to 48 hours............<12.0 mg/dL  3-5 days..................<15.0 mg/dL  6-29 days.................<15.0 mg/dL       Alkaline Phosphatase   Date Value Ref Range Status   07/25/2017 39 38 - 145 U/L Final     AST   Date Value Ref Range Status   07/25/2017 22 17 - 59 U/L Final     ALT   Date Value Ref Range Status   07/25/2017 24 10 - 44 U/L Final       Radiology/Diagnostic Studies:    None    Assessment/Discussion/Plan:   39 y.o. male with ankylosing spondylitis- good response to Enbrel      PLAN:  I will continue his medication unchanged.  Follow-up blood testing will be done in 4-5 months.    RTC:  I will see him back in 6 months    Electronically signed by Bartolo Lara MD

## 2020-11-11 ENCOUNTER — OFFICE VISIT (OUTPATIENT)
Dept: RHEUMATOLOGY | Facility: CLINIC | Age: 40
End: 2020-11-11

## 2020-11-11 VITALS
WEIGHT: 165.63 LBS | TEMPERATURE: 98 F | BODY MASS INDEX: 23.76 KG/M2 | DIASTOLIC BLOOD PRESSURE: 73 MMHG | SYSTOLIC BLOOD PRESSURE: 112 MMHG

## 2020-11-11 DIAGNOSIS — Z79.899 ENCOUNTER FOR LONG-TERM (CURRENT) DRUG USE: ICD-10-CM

## 2020-11-11 DIAGNOSIS — M45.9 ANKYLOSING SPONDYLITIS, UNSPECIFIED SITE OF SPINE: Primary | ICD-10-CM

## 2020-11-11 PROCEDURE — 99213 OFFICE O/P EST LOW 20 MIN: CPT | Mod: S$GLB,,, | Performed by: INTERNAL MEDICINE

## 2020-11-11 PROCEDURE — 99213 PR OFFICE/OUTPT VISIT, EST, LEVL III, 20-29 MIN: ICD-10-PCS | Mod: S$GLB,,, | Performed by: INTERNAL MEDICINE

## 2020-11-11 NOTE — PROGRESS NOTES
[facility] RHEUMATOLOGY           Follow-up visit    Notes dictated to M*Modal. Please forgive any unintended errors.  Subjective:       Patient ID:   NAME: Rosalinda Rojo : 1980     40 y.o. male    Referring Doc: No ref. provider found  Other Physicians:    Chief Complaint:  Ankylosing spondylitis      HPI/Interval History:  The patient is doing very well.  He has had no back pain or stiffness.  He has no problems carrying out his chores and his ADLs    ROS:   GEN:    no fever, night sweats or weight loss  SKIN:   no rashes, bruising, or swelling, no Raynauds, no photosensitivity  HEENT: no changes in vision, no mouth ulcers, no sicca symptoms, no scalp tenderness, no jaw claudication.  CV:      no CP, PND, PALACIOS, orthopnea, no palpitations  PULM: no SOB, cough, hemoptysis, sputum or pleuritic pain  GI:        no dysphagia, no GERD, no hematemesis, no abdominal pain, nausea, vomiting, constipation, diarrhea, melanotic stools, BRBPR  :      no hematuria, dysuria  NEURO: no paresthesias, headaches, acute visual disturbances  MUSCULOSKELETAL:  No red, hot, and/or swollen joints  PSYCH:   No increased insomnia, no increased anxiety, no increased depression    Past Medical/Surgical History:  Past Medical History:   Diagnosis Date    Ankylosing spondylitis lumbar region     Ankylosing spondylitis lumbar region     Back pain     Bilateral Shoulder Strain     Wellness Visit 17      History reviewed. No pertinent surgical history.    Allergies:  Review of patient's allergies indicates:  No Known Allergies    Social/Family History:  Social History     Socioeconomic History    Marital status:      Spouse name: Not on file    Number of children: Not on file    Years of education: Not on file    Highest education level: Not on file   Occupational History    Not on file   Social Needs    Financial resource strain: Not on file    Food insecurity     Worry: Not on file     Inability: Not on file     Transportation needs     Medical: Not on file     Non-medical: Not on file   Tobacco Use    Smoking status: Never Smoker    Smokeless tobacco: Never Used   Substance and Sexual Activity    Alcohol use: No    Drug use: No    Sexual activity: Yes     Partners: Female   Lifestyle    Physical activity     Days per week: Not on file     Minutes per session: Not on file    Stress: Not on file   Relationships    Social connections     Talks on phone: Not on file     Gets together: Not on file     Attends Taoism service: Not on file     Active member of club or organization: Not on file     Attends meetings of clubs or organizations: Not on file     Relationship status: Not on file   Other Topics Concern    Not on file   Social History Narrative    Not on file     Family History   Problem Relation Age of Onset    Heart disease Father      FAMILY HISTORY: negative for Connective Tissue Disease      Medications:    Current Outpatient Medications:     etanercept (ENBREL) 50 mg/mL (0.98 mL) Syrg injection, Inject 1 mL (50 mg total) into the skin once a week., Disp: 12 mL, Rfl: 3    mupirocin (BACTROBAN) 2 % ointment, Apply topically 3 (three) times daily., Disp: 1 Tube, Rfl: 0      Objective:     Vitals:  Blood pressure 112/73, temperature 97.9 °F (36.6 °C), weight 75.1 kg (165 lb 9.6 oz).    Physical Examination:   GEN: wn/wd male in no apparent distress  SKIN: no rashes, no sclerodactyly, no Raynaud's, no periungual erythema, no digital tip ulcerations, no nailbed pitting  HEAD: no alopecia, no scalp tenderness, no temporal artery tenderness or induration.  EYES: no pallor, no icterus, PERRLA  ENT:  no thrush, no mucosal dryness or ulcerations, adequate oral hygiene & dentition.  NECK: supple x 6, no masses, no thyromegaly, no lymphadenopathy.  CV:   S1 and S2 regular, no murmurs, gallop or rubs  CHEST: Normal respiratory effort;  normal breath sounds/no adventitious sounds. No signs of consolidation.  ABD:  non-tender and non-distended; soft; normal bowel sounds; no rebound/guarding or tenderness. No hepatosplenomegaly.  Musculoskeletal:  No decrease in back flexion.  No peripheral inflammatory arthritis  EXTREM: no clubbing, cyanosis or edema. normal pulses.  NEURO:  grossly intact; motor/sensory WNL; no tremors  PSYCH:  normal mood, affect and behavior    Labs:   Lab Results   Component Value Date    WBC 4.72 07/25/2017    HGB 14.4 07/25/2017    HCT 44.4 07/25/2017    MCV 94 07/25/2017     07/25/2017   CMP@  Sodium   Date Value Ref Range Status   07/25/2017 139 136 - 145 mmol/L Final     Potassium   Date Value Ref Range Status   07/25/2017 4.1 3.5 - 5.1 mmol/L Final     Chloride   Date Value Ref Range Status   07/25/2017 102 95 - 110 mmol/L Final     CO2   Date Value Ref Range Status   07/25/2017 28 22 - 31 mmol/L Final     Glucose   Date Value Ref Range Status   07/25/2017 101 70 - 110 mg/dL Final     Comment:     The ADA recommends the following guidelines for fasting glucose:  Normal:       less than 100 mg/dL  Prediabetes:  100 mg/dL to 125 mg/dL  Diabetes:     126 mg/dL or higher       BUN   Date Value Ref Range Status   07/25/2017 23 (H) 9 - 21 mg/dL Final     Creatinine   Date Value Ref Range Status   07/25/2017 0.79 0.50 - 1.40 mg/dL Final     Calcium   Date Value Ref Range Status   07/25/2017 9.3 8.4 - 10.2 mg/dL Final     Total Protein   Date Value Ref Range Status   07/25/2017 7.3 6.0 - 8.4 g/dL Final     Albumin   Date Value Ref Range Status   07/25/2017 4.3 3.5 - 5.2 g/dL Final     Total Bilirubin   Date Value Ref Range Status   07/25/2017 0.9 0.2 - 1.3 mg/dL Final     Comment:     For infants and newborns, interpretation of results should be based  on gestational age, weight and in agreement with clinical  observations.  Premature Infant recommended reference ranges:  Up to 24 hours.............<8.0 mg/dL  Up to 48 hours............<12.0 mg/dL  3-5 days..................<15.0 mg/dL  6-29  days.................<15.0 mg/dL       Alkaline Phosphatase   Date Value Ref Range Status   07/25/2017 39 38 - 145 U/L Final     AST   Date Value Ref Range Status   07/25/2017 22 17 - 59 U/L Final     ALT   Date Value Ref Range Status   07/25/2017 24 10 - 44 U/L Final       Radiology/Diagnostic Studies:    None    Assessment/Discussion/Plan:   40 y.o. male with ankylosing spondylitis-stable on Enbrel    PLAN:  I will continue his medication without change.      RTC:  I will see him back in 6 months    Electronically signed by Bartolo Lara MD

## 2021-05-12 ENCOUNTER — OFFICE VISIT (OUTPATIENT)
Dept: RHEUMATOLOGY | Facility: CLINIC | Age: 41
End: 2021-05-12

## 2021-05-12 VITALS — BODY MASS INDEX: 23.9 KG/M2 | DIASTOLIC BLOOD PRESSURE: 75 MMHG | SYSTOLIC BLOOD PRESSURE: 114 MMHG | WEIGHT: 166.63 LBS

## 2021-05-12 DIAGNOSIS — M45.9 ANKYLOSING SPONDYLITIS, UNSPECIFIED SITE OF SPINE: Primary | ICD-10-CM

## 2021-05-12 DIAGNOSIS — Z79.899 ENCOUNTER FOR LONG-TERM (CURRENT) DRUG USE: ICD-10-CM

## 2021-05-12 PROCEDURE — 99213 OFFICE O/P EST LOW 20 MIN: CPT | Mod: S$GLB,,, | Performed by: INTERNAL MEDICINE

## 2021-05-12 PROCEDURE — 99213 PR OFFICE/OUTPT VISIT, EST, LEVL III, 20-29 MIN: ICD-10-PCS | Mod: S$GLB,,, | Performed by: INTERNAL MEDICINE

## 2021-11-17 ENCOUNTER — OFFICE VISIT (OUTPATIENT)
Dept: RHEUMATOLOGY | Facility: CLINIC | Age: 41
End: 2021-11-17

## 2021-11-17 VITALS
WEIGHT: 165.63 LBS | BODY MASS INDEX: 23.76 KG/M2 | SYSTOLIC BLOOD PRESSURE: 113 MMHG | DIASTOLIC BLOOD PRESSURE: 68 MMHG

## 2021-11-17 DIAGNOSIS — M45.9 ANKYLOSING SPONDYLITIS, UNSPECIFIED SITE OF SPINE: Primary | ICD-10-CM

## 2021-11-17 DIAGNOSIS — Z79.899 ENCOUNTER FOR LONG-TERM (CURRENT) DRUG USE: ICD-10-CM

## 2021-11-17 PROCEDURE — 99213 OFFICE O/P EST LOW 20 MIN: CPT | Mod: S$GLB,,, | Performed by: INTERNAL MEDICINE

## 2021-11-17 PROCEDURE — 99213 PR OFFICE/OUTPT VISIT, EST, LEVL III, 20-29 MIN: ICD-10-PCS | Mod: S$GLB,,, | Performed by: INTERNAL MEDICINE

## 2023-02-03 ENCOUNTER — TELEPHONE (OUTPATIENT)
Dept: DERMATOLOGY | Facility: CLINIC | Age: 43
End: 2023-02-03
Payer: COMMERCIAL

## 2023-02-03 NOTE — TELEPHONE ENCOUNTER
Spoke with patient regarding appt with Dr. Ott.  Provider will have to reschedule to not feeling well.  Staff will call to reschedule pt.

## 2023-03-23 ENCOUNTER — TELEPHONE (OUTPATIENT)
Dept: RHEUMATOLOGY | Facility: CLINIC | Age: 43
End: 2023-03-23
Payer: COMMERCIAL

## 2023-03-23 NOTE — TELEPHONE ENCOUNTER
----- Message from Mary Marie sent at 3/23/2023 12:26 PM CDT -----  Contact: patient  Type:  Sooner Appointment Request    Caller is requesting a sooner appointment.  Caller declined first available appointment listed below.  Caller will not accept being placed on the waitlist and is requesting a message be sent to doctor.    Name of Caller:  patient  When is the first available appointment?  N/a  Symptoms:  Ankylosing spondylitis  Best Call Back Number:  [phone]   Additional Information:  patient of dr adame

## 2023-03-31 ENCOUNTER — TELEPHONE (OUTPATIENT)
Dept: RHEUMATOLOGY | Facility: CLINIC | Age: 43
End: 2023-03-31

## 2023-04-03 ENCOUNTER — PATIENT MESSAGE (OUTPATIENT)
Dept: RHEUMATOLOGY | Facility: CLINIC | Age: 43
End: 2023-04-03
Payer: COMMERCIAL

## 2023-04-04 ENCOUNTER — OFFICE VISIT (OUTPATIENT)
Dept: RHEUMATOLOGY | Facility: CLINIC | Age: 43
End: 2023-04-04
Payer: COMMERCIAL

## 2023-04-04 VITALS
BODY MASS INDEX: 23.33 KG/M2 | DIASTOLIC BLOOD PRESSURE: 87 MMHG | SYSTOLIC BLOOD PRESSURE: 130 MMHG | HEART RATE: 67 BPM | HEIGHT: 70 IN | WEIGHT: 162.94 LBS

## 2023-04-04 DIAGNOSIS — L65.9 HAIR LOSS: ICD-10-CM

## 2023-04-04 DIAGNOSIS — M45.9 ANKYLOSING SPONDYLITIS, UNSPECIFIED SITE OF SPINE: Primary | ICD-10-CM

## 2023-04-04 DIAGNOSIS — D84.821 IMMUNOCOMPROMISED STATE DUE TO DRUG THERAPY: ICD-10-CM

## 2023-04-04 DIAGNOSIS — Z79.899 IMMUNOCOMPROMISED STATE DUE TO DRUG THERAPY: ICD-10-CM

## 2023-04-04 PROCEDURE — 3044F PR MOST RECENT HEMOGLOBIN A1C LEVEL <7.0%: ICD-10-PCS | Mod: CPTII,S$GLB,, | Performed by: PHYSICIAN ASSISTANT

## 2023-04-04 PROCEDURE — 1160F PR REVIEW ALL MEDS BY PRESCRIBER/CLIN PHARMACIST DOCUMENTED: ICD-10-PCS | Mod: CPTII,S$GLB,, | Performed by: PHYSICIAN ASSISTANT

## 2023-04-04 PROCEDURE — 1160F RVW MEDS BY RX/DR IN RCRD: CPT | Mod: CPTII,S$GLB,, | Performed by: PHYSICIAN ASSISTANT

## 2023-04-04 PROCEDURE — 99999 PR PBB SHADOW E&M-EST. PATIENT-LVL IV: ICD-10-PCS | Mod: PBBFAC,,, | Performed by: PHYSICIAN ASSISTANT

## 2023-04-04 PROCEDURE — 1159F PR MEDICATION LIST DOCUMENTED IN MEDICAL RECORD: ICD-10-PCS | Mod: CPTII,S$GLB,, | Performed by: PHYSICIAN ASSISTANT

## 2023-04-04 PROCEDURE — 3075F SYST BP GE 130 - 139MM HG: CPT | Mod: CPTII,S$GLB,, | Performed by: PHYSICIAN ASSISTANT

## 2023-04-04 PROCEDURE — 99999 PR PBB SHADOW E&M-EST. PATIENT-LVL IV: CPT | Mod: PBBFAC,,, | Performed by: PHYSICIAN ASSISTANT

## 2023-04-04 PROCEDURE — 3075F PR MOST RECENT SYSTOLIC BLOOD PRESS GE 130-139MM HG: ICD-10-PCS | Mod: CPTII,S$GLB,, | Performed by: PHYSICIAN ASSISTANT

## 2023-04-04 PROCEDURE — 3079F DIAST BP 80-89 MM HG: CPT | Mod: CPTII,S$GLB,, | Performed by: PHYSICIAN ASSISTANT

## 2023-04-04 PROCEDURE — 3044F HG A1C LEVEL LT 7.0%: CPT | Mod: CPTII,S$GLB,, | Performed by: PHYSICIAN ASSISTANT

## 2023-04-04 PROCEDURE — 99215 OFFICE O/P EST HI 40 MIN: CPT | Mod: S$GLB,,, | Performed by: PHYSICIAN ASSISTANT

## 2023-04-04 PROCEDURE — 3079F PR MOST RECENT DIASTOLIC BLOOD PRESSURE 80-89 MM HG: ICD-10-PCS | Mod: CPTII,S$GLB,, | Performed by: PHYSICIAN ASSISTANT

## 2023-04-04 PROCEDURE — 3008F PR BODY MASS INDEX (BMI) DOCUMENTED: ICD-10-PCS | Mod: CPTII,S$GLB,, | Performed by: PHYSICIAN ASSISTANT

## 2023-04-04 PROCEDURE — 3008F BODY MASS INDEX DOCD: CPT | Mod: CPTII,S$GLB,, | Performed by: PHYSICIAN ASSISTANT

## 2023-04-04 PROCEDURE — 99215 PR OFFICE/OUTPT VISIT, EST, LEVL V, 40-54 MIN: ICD-10-PCS | Mod: S$GLB,,, | Performed by: PHYSICIAN ASSISTANT

## 2023-04-04 PROCEDURE — 1159F MED LIST DOCD IN RCRD: CPT | Mod: CPTII,S$GLB,, | Performed by: PHYSICIAN ASSISTANT

## 2023-04-04 RX ORDER — CYCLOBENZAPRINE HCL 10 MG
10 TABLET ORAL NIGHTLY PRN
Qty: 30 TABLET | Refills: 5 | Status: SHIPPED | OUTPATIENT
Start: 2023-04-04 | End: 2023-07-10

## 2023-04-04 NOTE — Clinical Note
Needs f/u with dr mendiola in the next 3 months if possible X-rays and labs this week Enbrel safety net form signed in folder, pt states he will fax

## 2023-04-04 NOTE — PROGRESS NOTES
Subjective:       Patient ID: Rosalinda Rojo is a 42 y.o. male.    Chief Complaint: Disease Management    Mr. Rojo is a 42 year old male who presents to clinic to establish care for Ankylosing spondylitis. He is a new patient to me. He has medical history of pre-diabetes and hyperlipidemia. He was diagnosed with ankylosing spondylitis in 2010 and started treatment with Enbrel approx 10 years ago. He has not been treated with any other biologic in the past.     He is doing fair on Enbrel. He denies low back pain or stiffness. He has intermittent neck pain. He has chronic bilateral scapular pain since 2018. 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 He has tried bilateral shoulder injections without much relief. He has intermittent flares of elbow pain, which he receives IACS injection with Orthopedics that is helpful.     He recently seen by physician in [place]--trying herbal medicine and acupuncture for chronic shoulder pain without improvement. He was prescribed diclofenac but has not started this yet.     He complains of generalized hair loss. Other sx include poor sleep and difficulty with sleep initiation.     He has rash on the forearms--using otc cream.     He reports occasional chest pain--sharp pain that lasts 10 seconds and resolves and occurs only at rest 1 episode in the last 3 months. No chest or sob with exertion.     Review of Systems   Constitutional:  Positive for activity change. Negative for chills, fatigue and fever.   Eyes:  Negative for visual disturbance.   Respiratory:  Negative for cough, shortness of breath and wheezing.    Cardiovascular:  Negative for chest pain, palpitations and leg swelling.   Gastrointestinal:  Negative for abdominal pain, constipation, diarrhea, nausea and vomiting.   Musculoskeletal:  Positive for back pain and myalgias. Negative for arthralgias.   Skin:  Positive for rash.        Hair loss   Allergic/Immunologic: Positive for  immunocompromised state.   Neurological:  Negative for dizziness, syncope and headaches.   Hematological:  Negative for adenopathy.       Objective:     Vitals:    04/04/23 1619   BP: 130/87   Pulse: 67       Past Medical History:   Diagnosis Date    Ankylosing spondylitis lumbar region     Ankylosing spondylitis lumbar region     Back pain     Bilateral Shoulder Strain     Wellness Visit 7/25/17      History reviewed. No pertinent surgical history.       Physical Exam   Constitutional: He is oriented to person, place, and time.   Eyes: Right conjunctiva is not injected. Left conjunctiva is not injected.   Neck: No JVD present. No thyromegaly present.   Cardiovascular: Normal rate.   Pulmonary/Chest: Effort normal.   Musculoskeletal:      Right shoulder: Normal.      Left shoulder: Normal.      Right elbow: Normal.      Left elbow: Normal.      Right wrist: Normal.      Left wrist: Normal.      Right knee: Normal.      Left knee: Normal.   Lymphadenopathy:     He has no cervical adenopathy.   Neurological: He is alert and oriented to person, place, and time. Gait normal.   Skin: No rash noted.   Psychiatric: Mood and affect normal.       Right Side Rheumatological Exam     Examination finds the shoulder, elbow, wrist, knee, 1st MCP, 2nd PIP, 2nd MCP, 3rd PIP, 3rd MCP, 4th PIP, 4th MCP, 5th PIP and 5th MCP normal.    The patient has an enlarged 1st PIP    Left Side Rheumatological Exam     Examination finds the shoulder, elbow, wrist, knee, 1st MCP, 2nd PIP, 2nd MCP, 3rd PIP, 3rd MCP, 4th PIP, 4th MCP, 5th PIP and 5th MCP normal.    The patient has an enlarged 1st PIP.      Back/Neck Exam     Comments:  +ttp of medial border scapula      Labs:  Component      Latest Ref Rng & Units 3/30/2023   WBC      3.90 - 12.70 K/uL 5.88   RBC      4.60 - 6.20 M/uL 4.79   Hemoglobin      14.0 - 18.0 g/dL 14.3   Hematocrit      40.0 - 54.0 % 45.2   MCV      82 - 98 fL 94   MCH      27.0 - 31.0 pg 29.9   MCHC      32.0 - 36.0 g/dL  31.6 (L)   RDW      11.5 - 14.5 % 12.2   Platelets      150 - 450 K/uL 296   MPV      9.2 - 12.9 fL 9.4   Immature Granulocytes      0.0 - 0.5 % 0.3   Gran # (ANC)      1.8 - 7.7 K/uL 3.1   Immature Grans (Abs)      0.00 - 0.04 K/uL 0.02   Lymph #      1.0 - 4.8 K/uL 2.3   Mono #      0.3 - 1.0 K/uL 0.4   Eos #      0.0 - 0.5 K/uL 0.1   Baso #      0.00 - 0.20 K/uL 0.04   nRBC      0 /100 WBC 0   Gran %      38.0 - 73.0 % 53.4   Lymph %      18.0 - 48.0 % 38.3   Mono %      4.0 - 15.0 % 6.1   Eosinophil %      0.0 - 8.0 % 1.2   Basophil %      0.0 - 1.9 % 0.7   Differential Method       Automated   Sodium      136 - 145 mmol/L 139   Potassium      3.5 - 5.1 mmol/L 4.1   Chloride      95 - 110 mmol/L 102   CO2      22 - 31 mmol/L 31   Glucose      70 - 110 mg/dL 129 (H)   BUN      9 - 21 mg/dL 24 (H)   Creatinine      0.50 - 1.40 mg/dL 0.86   Calcium      8.4 - 10.2 mg/dL 9.3   PROTEIN TOTAL      6.0 - 8.4 g/dL 7.2   Albumin      3.5 - 5.2 g/dL 4.4   BILIRUBIN TOTAL      0.2 - 1.3 mg/dL 0.7   Alkaline Phosphatase      38 - 145 U/L 37 (L)   AST      17 - 59 U/L 28   ALT      0 - 50 U/L 27   Anion Gap      8 - 16 mmol/L 6 (L)   eGFR      >60 mL/min/1.73 m:2 >60   Cholesterol      120 - 199 mg/dL 228 (H)   Triglycerides      30 - 150 mg/dL 99   HDL      40 - 75 mg/dL 59   LDL Cholesterol External      63.0 - 159.0 mg/dL 149.2   HDL/Cholesterol Ratio      20.0 - 50.0 % 25.9   Total Cholesterol/HDL Ratio      2.0 - 5.0 3.9   Non-HDL Cholesterol      mg/dL 169   Hemoglobin A1C External      0.0 - 5.6 % 6.4 (H)   Estimated Avg Glucose      68 - 131 mg/dL 137 (H)   PSA, Screen      0.00 - 4.00 ng/mL 0.29     Assessment:       1. Ankylosing spondylitis, unspecified site of spine    2. Hair loss    3. Immunocompromised state due to drug therapy            Plan:       Ankylosing spondylitis, unspecified site of spine  -     Hepatitis C Antibody; Future; Expected date: 04/04/2023  -     Quantiferon Gold TB; Future; Expected  date: 04/04/2023  -     Hepatitis B Surface Antigen; Future; Expected date: 04/04/2023  -     HEPATITIS B SURFACE ANTIBODY; Future; Expected date: 04/04/2023  -     Hepatitis B Core Antibody, Total; Future; Expected date: 04/04/2023  -     Sedimentation rate; Future; Expected date: 04/04/2023  -     C-REACTIVE PROTEIN; Future; Expected date: 04/04/2023  -     X-Ray Cervical Spine AP And Lateral; Future; Expected date: 04/04/2023  -     X-Ray Thoracic Spine AP Lateral; Future; Expected date: 04/04/2023  -     X-Ray Lumbar Spine AP And Lateral; Future; Expected date: 04/04/2023  -     X-Ray Sacroiliac Joints 3 Views; Future; Expected date: 04/04/2023  -     cyclobenzaprine (FLEXERIL) 10 MG tablet; Take 1 tablet (10 mg total) by mouth nightly as needed for Muscle spasms.  Dispense: 30 tablet; Refill: 5  -     HLA B27 ANTIGEN; Future; Expected date: 04/04/2023    Hair loss  -     TSH; Future; Expected date: 04/04/2023    Immunocompromised state due to drug therapy        Assessment:  42 year old male with  Ankylosing spondylitis  --pre diabetes, HgbA1c 5.6%  --hyperlipidemia      Plan:  Check labs and x-rays  Add flexeril 10 mg qhs  Start diclofenac PRN  Cont Enbrel weekly. Consider Cosentyx in the future  Follow up with Dr. Guerrero regarding chest pain    Follow up:  4 mo Dr. Ugarte

## 2023-04-05 ENCOUNTER — HOSPITAL ENCOUNTER (OUTPATIENT)
Dept: RADIOLOGY | Facility: HOSPITAL | Age: 43
Discharge: HOME | End: 2023-04-05
Attending: PHYSICIAN ASSISTANT
Payer: COMMERCIAL

## 2023-04-05 DIAGNOSIS — M45.9 ANKYLOSING SPONDYLITIS, UNSPECIFIED SITE OF SPINE: ICD-10-CM

## 2023-04-05 PROCEDURE — 72200 XR SACROILIAC JOINTS 3 VIEWS: ICD-10-PCS | Mod: 26,,, | Performed by: RADIOLOGY

## 2023-04-05 PROCEDURE — 72200 X-RAY EXAM SI JOINTS: CPT | Mod: TC,FY,PO

## 2023-04-05 PROCEDURE — 72070 X-RAY EXAM THORAC SPINE 2VWS: CPT | Mod: TC,FY,PO

## 2023-04-05 PROCEDURE — 72200 X-RAY EXAM SI JOINTS: CPT | Mod: 26,,, | Performed by: RADIOLOGY

## 2023-04-05 PROCEDURE — 72100 X-RAY EXAM L-S SPINE 2/3 VWS: CPT | Mod: 26,,, | Performed by: RADIOLOGY

## 2023-04-05 PROCEDURE — 72040 XR CERVICAL SPINE AP LATERAL: ICD-10-PCS | Mod: 26,,, | Performed by: RADIOLOGY

## 2023-04-05 PROCEDURE — 72100 XR LUMBAR SPINE AP AND LATERAL: ICD-10-PCS | Mod: 26,,, | Performed by: RADIOLOGY

## 2023-04-05 PROCEDURE — 72040 X-RAY EXAM NECK SPINE 2-3 VW: CPT | Mod: TC,FY,PO

## 2023-04-05 PROCEDURE — 72040 X-RAY EXAM NECK SPINE 2-3 VW: CPT | Mod: 26,,, | Performed by: RADIOLOGY

## 2023-04-05 PROCEDURE — 72100 X-RAY EXAM L-S SPINE 2/3 VWS: CPT | Mod: TC,FY,PO

## 2023-04-05 PROCEDURE — 72070 X-RAY EXAM THORAC SPINE 2VWS: CPT | Mod: 26,,, | Performed by: RADIOLOGY

## 2023-04-05 PROCEDURE — 72070 XR THORACIC SPINE AP LATERAL: ICD-10-PCS | Mod: 26,,, | Performed by: RADIOLOGY

## 2023-04-13 DIAGNOSIS — M45.5 ANKYLOSING SPONDYLITIS OF THORACOLUMBAR REGION: ICD-10-CM

## 2023-04-13 RX ORDER — ETANERCEPT 50 MG/ML
50 SOLUTION SUBCUTANEOUS WEEKLY
Qty: 4 EACH | Refills: 11 | OUTPATIENT
Start: 2023-04-13 | End: 2023-04-18 | Stop reason: SDUPTHER

## 2023-04-13 RX ORDER — ETANERCEPT 50 MG/ML
50 SOLUTION SUBCUTANEOUS WEEKLY
Qty: 12 ML | Refills: 3 | Status: CANCELLED | OUTPATIENT
Start: 2023-04-13

## 2023-04-16 ENCOUNTER — SPECIALTY PHARMACY (OUTPATIENT)
Dept: PHARMACY | Facility: CLINIC | Age: 43
End: 2023-04-16
Payer: COMMERCIAL

## 2023-04-16 NOTE — TELEPHONE ENCOUNTER
Order for Enbrel continuation. PA required. Submitted questions via CMM. Key: [id number]    Awaiting determination.

## 2023-04-17 NOTE — TELEPHONE ENCOUNTER
Patient incoming- informed Enbrel PAP renewal currently. Clarified that PAP application of patient/provider has already been completed and submitted to [employer] prior auth renewal.Informed of PA approval by [facility]. Would like us to kyra as urgent as he is due and to provide gen PA approval/prescription for redetermination.Informing team.

## 2023-04-17 NOTE — TELEPHONE ENCOUNTER
PA for Enbrel    Approved on April 16  CaseId:[id number]  Coverage Start Date:04/16/2023  Coverage End Date:04/15/2024    [employer] [place] commercial plan.  Very high estimated copay    Forwarding to BI

## 2023-04-17 NOTE — TELEPHONE ENCOUNTER
Approval letter is not on M. Spoke with Keely at [facility]. She is going to fax a copy of the PA letter to [facility]. If the fax doesn't come through, we'll need to call [phone] for another copy.

## 2023-04-18 ENCOUNTER — PATIENT MESSAGE (OUTPATIENT)
Dept: RHEUMATOLOGY | Facility: CLINIC | Age: 43
End: 2023-04-18
Payer: COMMERCIAL

## 2023-04-18 NOTE — TELEPHONE ENCOUNTER
PA letter received and faxed to [facility] at [phone]. Patient updated by telephone. He thinks that he still has the doctor's page but stated that he was told that he only needed a PA letter. I sent an in-basket message to Gabriela Andersen PA-C asking her to send an electronic Rx to [facility] ([phone]) just in case. Patient stated that he was leaving for [place] next week, so he does not have time for any processing delays.

## 2023-04-18 NOTE — TELEPHONE ENCOUNTER
Benefit Investigation ([employer]      Drug Name: Enbrel        Insurance per (Rep PRC Website)   Deductible: $3,400 ( $1,607.93 Accumulated)   Max OOP: $,7,900 ($1,609.93 Accumulated)   Estimated copay $3,836.23 Co Insurance $1,949.72)   [facility] in Network  FA Pending Review

## 2023-04-18 NOTE — TELEPHONE ENCOUNTER
Spoke with Jesusita at the [employer] PA department. She is going to send a copy of the PA approval letter.     Spoke with Naa at the [employer] [facility]. She confirmed that they need a copy of the PA letter and asked that the provider's portion be sent again because the dispense quantity was not selected (prescription is not valid). She informed me that the patient was made aware of this yesterday when he called to check on his application. Attempted to contact patient but he did not answer. SARAH.

## 2023-04-18 NOTE — TELEPHONE ENCOUNTER
Incoming call from patient, informed patient [facility] is awaiting PA letter from plan to submit to [employer]. Per patient, is overdue for dose, routing to FA team to inform.

## 2023-04-19 ENCOUNTER — PATIENT MESSAGE (OUTPATIENT)
Dept: PHARMACY | Facility: CLINIC | Age: 43
End: 2023-04-19
Payer: COMMERCIAL

## 2023-04-19 NOTE — TELEPHONE ENCOUNTER
Spoke with Lindsey at [facility]. She stated that the patient indicated that he did not have health insurance. We did a conference call with  Darrel, Lindsey, and Cora at [facility] to confirm his insurance benefits. Lindsey confirmed that they received the PA letter and the e-script from Gabriela Andersen PA-C. I asked that she expedite his application since he is traveling out of the country next week. She send the case to processing. Patient is aware.

## 2023-04-20 ENCOUNTER — PATIENT MESSAGE (OUTPATIENT)
Dept: RHEUMATOLOGY | Facility: CLINIC | Age: 43
End: 2023-04-20
Payer: COMMERCIAL

## 2023-04-20 NOTE — TELEPHONE ENCOUNTER
Messages sent to Gabriela Andersen PA-C regarding Mr. Rojo's travel (see telephone encounter dated 04/18/2023):     04/19/2023    Good morning,     Thank you for sending the prescription to [facility]. I'm not sure of the laws set by [place] [employer] regarding medications for personal use. I would have the patient contact the hetras. The [employer] website was not accessible to me on the [facility]  this morning. At minimum, I recommend that he bring the medication in its original packaging along with a copy of his prescription. I'm not sure if he will need a letter of medical necessity or other documentation. I attached two links to the [facility] regarding travel with medication. The second link is specific to [place].     https://wwwnc.[facility].gov/travel/page/travel-abroad-with-medicine   https://wwwnc.cdc.gov/travel/destinations/[place]/traveler/packing-list     Because Enbrel is refrigerated, he will need to utilize and ice chest or refrigerated travel kit. I spoke with him yesterday about keeping his medication at a refrigerated temperature for the duration of his travel. I also sent him the [employer] slide set regarding travel considerations. The slides can be found at the link below.     https://www.[employer]/en/-/[email or web address]/travel-brochure.pdf     Blaine Samson, PharmD   Clinical Pharmacist     [facility]   [address] [place] [id number]   Phone: [phone]   Fax: [phone]     ~~~~~~~~~~~~~~~~~~~~~~~~~~~~~~~~~~~~~~~~~~~~~~~~~~~~~~~~~~~~~~~    04/20/2023    Mr. Rojo called and said that he was able to get samples of Enbrel. He asked if we could provide him with a copy of the prescription. We are unable to provide a copy to him because we currently do not own a valid prescription and because the medication was not dispensed from [facility].     I spoke with Ely at the [facility]  Department (ph: [phone]). She stated that they have not assisted patients traveling to [place], but she recommended writing a letter of medical necessity to accompany the Enbrel samples.     She recommend including the following information in the letter:      + Patient's name      + Patient's date of birth      + Name of the medication      + Dose      + Day supply (specifying that it is for personal use only)      + A statement recognizing that they are samples      + His diagnosis and reason that he must be on continued treatment     I recommend that the patient contact the [place] [employer] to ensure that his prescription medication will not be confiscated at the point of entry.     Blaine Samson, PharmD   Clinical Pharmacist     [facility]   [address], [place] [address]   Phone: [phone]   Fax: [phone]

## 2023-04-20 NOTE — TELEPHONE ENCOUNTER
Incoming call from patient to request copy of Enbrel Rx to bring with him to [place]. Patient was given samples from his provider while PAP determination pending. Informed patient we cannot give a copy of the Rx as it is electronic and advised him to follow up with his provider to obtain a letter of medical necessity. Also recommended he contact the [place] [employer] if possible to assess if anything else is needed to travel with the samples. Patient verbalized understanding. Routing assigned [facility] to inform.

## 2023-04-21 NOTE — TELEPHONE ENCOUNTER
"Patient's renewal application was denied by the [employer] with the reason "coverage available for Enbrel." My recommendation is for him to use an Enbrel Support Plus co-pay card in order to lower his co-pay until he reaches the $7,900 OOP max. Patient has enough medication to last the duration of his trip out of the country. He will need medication once he returns. [facility] does not have an active prescription for Enbrel.   "

## 2023-06-02 ENCOUNTER — OFFICE VISIT (OUTPATIENT)
Dept: RHEUMATOLOGY | Facility: CLINIC | Age: 43
End: 2023-06-02
Payer: COMMERCIAL

## 2023-06-02 VITALS
HEIGHT: 70 IN | BODY MASS INDEX: 23.34 KG/M2 | HEART RATE: 63 BPM | WEIGHT: 163 LBS | DIASTOLIC BLOOD PRESSURE: 66 MMHG | SYSTOLIC BLOOD PRESSURE: 107 MMHG

## 2023-06-02 DIAGNOSIS — G89.29 CHRONIC SCAPULAR PAIN: ICD-10-CM

## 2023-06-02 DIAGNOSIS — M89.8X1 CHRONIC SCAPULAR PAIN: ICD-10-CM

## 2023-06-02 DIAGNOSIS — D84.821 IMMUNOCOMPROMISED STATE DUE TO DRUG THERAPY: ICD-10-CM

## 2023-06-02 DIAGNOSIS — Z79.899 IMMUNOCOMPROMISED STATE DUE TO DRUG THERAPY: ICD-10-CM

## 2023-06-02 DIAGNOSIS — L20.9 ATOPIC DERMATITIS, UNSPECIFIED TYPE: ICD-10-CM

## 2023-06-02 DIAGNOSIS — M45.6 ANKYLOSING SPONDYLITIS OF LUMBAR REGION: Primary | ICD-10-CM

## 2023-06-02 PROCEDURE — 3008F BODY MASS INDEX DOCD: CPT | Mod: CPTII,S$GLB,, | Performed by: PHYSICIAN ASSISTANT

## 2023-06-02 PROCEDURE — 3074F PR MOST RECENT SYSTOLIC BLOOD PRESSURE < 130 MM HG: ICD-10-PCS | Mod: CPTII,S$GLB,, | Performed by: PHYSICIAN ASSISTANT

## 2023-06-02 PROCEDURE — 3078F DIAST BP <80 MM HG: CPT | Mod: CPTII,S$GLB,, | Performed by: PHYSICIAN ASSISTANT

## 2023-06-02 PROCEDURE — 3044F PR MOST RECENT HEMOGLOBIN A1C LEVEL <7.0%: ICD-10-PCS | Mod: CPTII,S$GLB,, | Performed by: PHYSICIAN ASSISTANT

## 2023-06-02 PROCEDURE — 3074F SYST BP LT 130 MM HG: CPT | Mod: CPTII,S$GLB,, | Performed by: PHYSICIAN ASSISTANT

## 2023-06-02 PROCEDURE — 1159F MED LIST DOCD IN RCRD: CPT | Mod: CPTII,S$GLB,, | Performed by: PHYSICIAN ASSISTANT

## 2023-06-02 PROCEDURE — 1160F RVW MEDS BY RX/DR IN RCRD: CPT | Mod: CPTII,S$GLB,, | Performed by: PHYSICIAN ASSISTANT

## 2023-06-02 PROCEDURE — 3008F PR BODY MASS INDEX (BMI) DOCUMENTED: ICD-10-PCS | Mod: CPTII,S$GLB,, | Performed by: PHYSICIAN ASSISTANT

## 2023-06-02 PROCEDURE — 3078F PR MOST RECENT DIASTOLIC BLOOD PRESSURE < 80 MM HG: ICD-10-PCS | Mod: CPTII,S$GLB,, | Performed by: PHYSICIAN ASSISTANT

## 2023-06-02 PROCEDURE — 1159F PR MEDICATION LIST DOCUMENTED IN MEDICAL RECORD: ICD-10-PCS | Mod: CPTII,S$GLB,, | Performed by: PHYSICIAN ASSISTANT

## 2023-06-02 PROCEDURE — 1160F PR REVIEW ALL MEDS BY PRESCRIBER/CLIN PHARMACIST DOCUMENTED: ICD-10-PCS | Mod: CPTII,S$GLB,, | Performed by: PHYSICIAN ASSISTANT

## 2023-06-02 PROCEDURE — 99214 PR OFFICE/OUTPT VISIT, EST, LEVL IV, 30-39 MIN: ICD-10-PCS | Mod: S$GLB,,, | Performed by: PHYSICIAN ASSISTANT

## 2023-06-02 PROCEDURE — 99999 PR PBB SHADOW E&M-EST. PATIENT-LVL IV: ICD-10-PCS | Mod: PBBFAC,,, | Performed by: PHYSICIAN ASSISTANT

## 2023-06-02 PROCEDURE — 99999 PR PBB SHADOW E&M-EST. PATIENT-LVL IV: CPT | Mod: PBBFAC,,, | Performed by: PHYSICIAN ASSISTANT

## 2023-06-02 PROCEDURE — 3044F HG A1C LEVEL LT 7.0%: CPT | Mod: CPTII,S$GLB,, | Performed by: PHYSICIAN ASSISTANT

## 2023-06-02 PROCEDURE — 99214 OFFICE O/P EST MOD 30 MIN: CPT | Mod: S$GLB,,, | Performed by: PHYSICIAN ASSISTANT

## 2023-06-02 RX ORDER — DICLOFENAC SODIUM 75 MG/1
75 TABLET, DELAYED RELEASE ORAL 2 TIMES DAILY
COMMUNITY
Start: 2023-03-20 | End: 2023-07-10

## 2023-06-02 RX ORDER — HUMAN PAPILLOMAVIRUS 9-VALENT VACCINE, RECOMBINANT 40; 60; 40; 20; 20; 20; 20; 20; 30 UG/.5ML; UG/.5ML; UG/.5ML; UG/.5ML; UG/.5ML; UG/.5ML; UG/.5ML; UG/.5ML; UG/.5ML
INJECTION, SUSPENSION INTRAMUSCULAR
COMMUNITY
Start: 2023-03-23 | End: 2024-01-23

## 2023-06-02 RX ORDER — FLUOCINONIDE TOPICAL SOLUTION USP, 0.05% 0.5 MG/ML
SOLUTION TOPICAL
Qty: 60 ML | Refills: 0 | Status: SHIPPED | OUTPATIENT
Start: 2023-06-02 | End: 2024-01-23

## 2023-06-02 RX ORDER — ETANERCEPT 50 MG/ML
50 SOLUTION SUBCUTANEOUS WEEKLY
Qty: 12 ML | Refills: 3 | Status: ACTIVE | OUTPATIENT
Start: 2023-06-02 | End: 2024-06-01

## 2023-06-02 ASSESSMENT — ROUTINE ASSESSMENT OF PATIENT INDEX DATA (RAPID3)
MDHAQ FUNCTION SCORE: 0
PAIN SCORE: 1
PSYCHOLOGICAL DISTRESS SCORE: 0
FATIGUE SCORE: 0
PATIENT GLOBAL ASSESSMENT SCORE: 0
TOTAL RAPID3 SCORE: 0.33

## 2023-06-02 NOTE — PROGRESS NOTES
Subjective:       Patient ID: Rosalinda Rojo is a 42 y.o. male.    Chief Complaint: Disease Management    Mr. Rojo is a 42 year old male who presents clinic for follow up on Ankylosing spondylitis. He was not approved for Enbrel safetynet and has been out of medication approx 2 weeks. Prior to being off treatment, he was doing fair on Enbrel. He denies low back pain or stiffness. He has intermittent neck pain. He has chronic bilateral scapular pain since 2018. 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 He has tried bilateral shoulder injections without much relief. He tried trigger point injection with orthopedics, acupunture, and cupping without relief.  Flexeril seems to help some with this pain, but he would like further evaluation. He has intermittent L wrist pain.    Complains of itching in the ears.    We reviewed recent x-ray and labs.    Review of Systems   Constitutional:  Positive for activity change. Negative for chills, fatigue and fever.   Eyes:  Negative for visual disturbance.   Respiratory:  Negative for cough, shortness of breath and wheezing.    Cardiovascular:  Negative for chest pain, palpitations and leg swelling.   Gastrointestinal:  Negative for abdominal pain, constipation, diarrhea, nausea and vomiting.   Musculoskeletal:  Positive for back pain and myalgias. Negative for arthralgias.   Skin:  Positive for rash.        Hair loss   Allergic/Immunologic: Positive for immunocompromised state.   Neurological:  Negative for dizziness, syncope and headaches.   Hematological:  Negative for adenopathy.       Objective:     Vitals:    06/02/23 1414   BP: 107/66   Pulse: 63       Past Medical History:   Diagnosis Date    Ankylosing spondylitis lumbar region     Ankylosing spondylitis lumbar region     Back pain     Bilateral Shoulder Strain     Wellness Visit 7/25/17      History reviewed. No pertinent surgical history.       Physical Exam   Constitutional: He is oriented  to person, place, and time.   Eyes: Right conjunctiva is not injected. Left conjunctiva is not injected.   Neck: No JVD present. No thyromegaly present.   Cardiovascular: Normal rate.   Pulmonary/Chest: Effort normal.   Musculoskeletal:      Right shoulder: Normal.      Left shoulder: Normal.      Right elbow: Normal.      Left elbow: Normal.      Right wrist: Normal.      Left wrist: Normal.      Right knee: Normal.      Left knee: Normal.   Lymphadenopathy:     He has no cervical adenopathy.   Neurological: He is alert and oriented to person, place, and time. Gait normal.   Skin: No rash noted.   Psychiatric: Mood and affect normal.       Right Side Rheumatological Exam     Examination finds the shoulder, elbow, wrist, knee, 1st MCP, 2nd PIP, 2nd MCP, 3rd PIP, 3rd MCP, 4th PIP, 4th MCP, 5th PIP and 5th MCP normal.    The patient has an enlarged 1st PIP    Left Side Rheumatological Exam     Examination finds the shoulder, elbow, wrist, knee, 1st MCP, 2nd PIP, 2nd MCP, 3rd PIP, 3rd MCP, 4th PIP, 4th MCP, 5th PIP and 5th MCP normal.    The patient has an enlarged 1st PIP.      Back/Neck Exam     Comments:  +ttp of medial border scapula      Labs:  Component      Latest Ref Rng & Units 4/5/2023 4/5/2023 4/5/2023           3:06 PM  3:06 PM  3:06 PM   NIL      IU/mL   0.62929   TB1 - Nil      IU/mL   0.000   TB2 - Nil      IU/mL   0.000   Mitogen - Nil      IU/mL   10.000   TB Gold Plus      Negative   Negative   HLA B27 Interpretation         SAPE: 212 . . .   B27 Testing Date         04/13/2023 09:54 AM   HLA B27 Result         Positive   Hep B S Ab      mIU/mL Non-reactive <3.00    Hepatitis C Ab      Non-reactive   Non-reactive   Hepatitis B Surface Ag      Non-reactive   Non-reactive   Hep B Core Total Ab      Non-reactive   Non-reactive   Sed Rate      0 - 23 mm/Hr   5   CRP      0.0 - 8.2 mg/L   <0.3       Narrative & Impression  EXAMINATION:  XR SACROILIAC JOINTS 3 VIEWS     CLINICAL HISTORY:  Ankylosing  spondylitis of unspecified sites in spine     TECHNIQUE:  SI joints AP and obliques.     COMPARISON:  None     FINDINGS:  Mild sclerosis appears to be present in each SI joint as can be seen with a history of sacroiliitis or degenerative change.     Impression:     See above  Assessment:       1. Ankylosing spondylitis of lumbar region    2. Chronic scapular pain    3. Immunocompromised state due to drug therapy    4. Atopic dermatitis, unspecified type            Plan:       Ankylosing spondylitis of lumbar region  -     Ambulatory referral/consult to Rheumatology  -     etanercept (ENBREL SURECLICK) 50 mg/mL (1 mL); Inject 1 mL (50 mg total) into the skin once a week.  Dispense: 12 each; Refill: 3    Chronic scapular pain  -     Ambulatory referral/consult to Pain Clinic; Future; Expected date: 06/09/2023    Immunocompromised state due to drug therapy    Atopic dermatitis, unspecified type  -     fluocinonide (LIDEX) 0.05 % external solution; Apply thin layer to affected area twice daily as needed  Dispense: 60 mL; Refill: 0        Assessment:  42 year old male with  Ankylosing spondylitis (+HLBA27)  --pre diabetes, HgbA1c 5.6%  --hyperlipidemia      Plan:  Cont Enbrel weekly. Sent to [facility] for approval  Cont flexeril prn  Start diclofenac   Start lidex prn  Referral to pain management or consider PT with dry needling    Follow up:  2 mo Dr. Ugarte

## 2023-06-07 ENCOUNTER — PATIENT MESSAGE (OUTPATIENT)
Dept: PHARMACY | Facility: CLINIC | Age: 43
End: 2023-06-07
Payer: COMMERCIAL

## 2023-06-07 ENCOUNTER — TELEPHONE (OUTPATIENT)
Dept: PHARMACY | Facility: CLINIC | Age: 43
End: 2023-06-07
Payer: COMMERCIAL

## 2023-06-07 NOTE — TELEPHONE ENCOUNTER
Peter, this is Blaine, clinical pharmacist with [facility] that is part of your care team.  We have begun working on your prescription that your doctor has sent us. Our next steps include:     Working with your insurance company to obtain approval for your medication  Working with you to ensure your medication is affordable     We will be calling you along the way with updates on your medication but if you have any concerns or receive information that you would like to discuss please reach us at [phone].    Welcome call outcome: Patient/caregiver reached

## 2023-06-12 ENCOUNTER — SPECIALTY PHARMACY (OUTPATIENT)
Dept: PHARMACY | Facility: CLINIC | Age: 43
End: 2023-06-12
Payer: COMMERCIAL

## 2023-07-05 ENCOUNTER — SPECIALTY PHARMACY (OUTPATIENT)
Dept: PHARMACY | Facility: CLINIC | Age: 43
End: 2023-07-05
Payer: COMMERCIAL

## 2023-07-05 DIAGNOSIS — M45.9 ANKYLOSING SPONDYLITIS, UNSPECIFIED SITE OF SPINE: Primary | ICD-10-CM

## 2023-07-05 NOTE — TELEPHONE ENCOUNTER
Patient stated that he has a pen for 7/10. I informed him that we would need to wait to schedule his initial delivery and that I would call back next week. Patient will need medication for 07/17/2023.

## 2023-07-10 NOTE — TELEPHONE ENCOUNTER
Specialty Pharmacy - Initial Clinical Assessment    Specialty Medication Orders Linked to Encounter      Flowsheet Row Most Recent Value   Medication #1 etanercept (ENBREL SURECLICK) 50 mg/mL (1 mL) (Order#[id number], Rx#[id number])          Patient Diagnosis   M45.9 - Ankylosing spondylitis    Subjective    Rosalinda Rojo is a 42 y.o. male, who is followed by the specialty pharmacy service for management and education.    Recent Encounters       Date Type Provider Description    07/05/2023 Specialty Pharmacy [facility] Initial Clinical Assessment    06/12/2023 Specialty Pharmacy [facility] Referral Authorization    04/16/2023 Specialty Pharmacy Kenroy New PharmD Referral Authorization            Current Outpatient Medications   Medication Sig    etanercept (ENBREL SURECLICK) 50 mg/mL (1 mL) Inject 1 mL (50 mg total) into the skin once a week.    fluocinonide (LIDEX) 0.05 % external solution Apply thin layer to affected area twice daily as needed    GARDASIL 9, PF, 0.5 mL Syrg     metFORMIN (GLUCOPHAGE) 500 MG tablet Take 1 tablet (500 mg total) by mouth daily with breakfast.   Last reviewed on 7/10/2023 12:06 PM by Blaine Samson PharmD    Review of patient's allergies indicates:  No Known AllergiesLast reviewed on  7/10/2023 11:58 AM by Blaine Samson    Drug Interactions    Drug interactions evaluated: yes  Clinically relevant drug interactions identified: no  Provided the patient with educational material regarding drug interactions: not applicable         Adverse Effects    *All other systems reviewed and are negative       Assessment Questions - Documented Responses      Flowsheet Row Most Recent Value   Assessment    Medication Reconciliation completed for patient Yes   During the past 4 weeks, has patient missed any activities due to condition or medication? No   During the past 4 weeks, did patient have any of the following urgent care visits? None   Goals of Therapy Status Achieving   Status of the  "patients ability to self-administer: Is Able   All education points have been covered with patient? No, patient declined- printed education provided   Welcome packet contents reviewed and discussed with patient? Yes   Assesment completed? Yes   Plan Therapy continued   Do you need to open a clinical intervention (i-vent)? No   Do you want to schedule first shipment? Yes   Medication #1 Assessment Info    Patient status Existing medication, New to [facility]   Is this medication appropriate for the patient? Yes   Is this medication effective? Yes          Refill Questions - Documented Responses      Flowsheet Row Most Recent Value   Patient Availability and HIPAA Verification    Does patient want to proceed with activity? Yes   HIPAA/medical authority confirmed? Yes   Relationship to patient of person spoken to? Self   Refill Screening Questions    When does the patient need to receive the medication? 07/17/23   Refill Delivery Questions    How will the patient receive the medication? MEDRx   When does the patient need to receive the medication? 07/17/23   Shipping Address Home   Address in [facility] confirmed and updated if neccessary? Yes   Expected Copay ($) 3591.45   Is the patient able to afford the medication copay? Yes   Payment Method  CC on file   Days supply of Refill 28   Supplies needed? No supplies needed   Refill activity completed? Yes   Refill activity plan Refill scheduled   Shipment/Pickup Date: 07/13/23            Objective    He has a past medical history of Ankylosing spondylitis lumbar region, Ankylosing spondylitis lumbar region, Back pain, Bilateral Shoulder Strain, and Wellness Visit 7/25/17.    Tried/failed medications: unable to determine    BP Readings from Last 4 Encounters:   06/02/23 107/66   04/04/23 130/87   03/30/23 114/80   01/18/23 116/80     Ht Readings from Last 4 Encounters:   06/02/23 5' 10" (1.778 m)   04/04/23 5' 10" (1.778 m)   03/30/23 5' 10" (1.778 m) " "  12/29/22 5' 10" (1.778 m)     Wt Readings from Last 4 Encounters:   06/02/23 73.9 kg (163 lb)   04/04/23 73.9 kg (162 lb 14.7 oz)   03/30/23 73.9 kg (163 lb)   01/18/23 74.1 kg (163 lb 4.8 oz)       The goals of prescribed drug therapy management include:  Supporting patient to meet the prescriber's medical treatment objectives  Improving or maintaining quality of life  Maintaining optimal therapy adherence  Minimizing and managing side effects      Goals of Therapy Status: Achieving    Assessment/Plan  Patient plans to continue therapy without changes      Indication, dosage, appropriateness, effectiveness, safety and convenience of his specialty medication(s) were reviewed today.     Patient Education   Pharmacist offer to  patient was declined. Printed educational materials will be provided with medication.      Patient stated that he has been taking Enbrel for 10 years. He declined counseling, including proper injection technique, side effects, and safety precautions.     Tasks added this encounter   No tasks added.   Tasks due within next 3 months   7/10/2023 - Initial Clinical Assessment/Patient Education (Annual Reassessment)  6/26/2023 - Set up Initial Fill     [facility]  [address]  [facility] [address]  Phone: [phone]  Fax: [phone]  "

## 2023-08-03 ENCOUNTER — SPECIALTY PHARMACY (OUTPATIENT)
Dept: PHARMACY | Facility: CLINIC | Age: 43
End: 2023-08-03
Payer: COMMERCIAL

## 2023-08-03 NOTE — TELEPHONE ENCOUNTER
Outgoing call regarding Enbrel. Pt has 2 pens left on hand,usually injects on Mondays. Pt missed 1 dose, due to forgetting. Will follow up on 8/14 to schedule delivery needed for 8/21.

## 2023-08-07 ENCOUNTER — OFFICE VISIT (OUTPATIENT)
Dept: RHEUMATOLOGY | Facility: CLINIC | Age: 43
End: 2023-08-07
Payer: COMMERCIAL

## 2023-08-07 VITALS
HEART RATE: 67 BPM | WEIGHT: 167 LBS | SYSTOLIC BLOOD PRESSURE: 129 MMHG | HEIGHT: 70 IN | DIASTOLIC BLOOD PRESSURE: 82 MMHG | BODY MASS INDEX: 23.91 KG/M2

## 2023-08-07 DIAGNOSIS — M89.8X1 CHRONIC SCAPULAR PAIN: ICD-10-CM

## 2023-08-07 DIAGNOSIS — M45.6 ANKYLOSING SPONDYLITIS OF LUMBAR REGION: Primary | ICD-10-CM

## 2023-08-07 DIAGNOSIS — G89.29 CHRONIC SCAPULAR PAIN: ICD-10-CM

## 2023-08-07 DIAGNOSIS — M25.512 CHRONIC PAIN OF BOTH SHOULDERS: ICD-10-CM

## 2023-08-07 DIAGNOSIS — Z79.899 HIGH RISK MEDICATION USE: ICD-10-CM

## 2023-08-07 DIAGNOSIS — M25.511 CHRONIC PAIN OF BOTH SHOULDERS: ICD-10-CM

## 2023-08-07 DIAGNOSIS — G89.29 CHRONIC PAIN OF BOTH SHOULDERS: ICD-10-CM

## 2023-08-07 PROCEDURE — 3079F DIAST BP 80-89 MM HG: CPT | Mod: CPTII,S$GLB,, | Performed by: INTERNAL MEDICINE

## 2023-08-07 PROCEDURE — 3008F BODY MASS INDEX DOCD: CPT | Mod: CPTII,S$GLB,, | Performed by: INTERNAL MEDICINE

## 2023-08-07 PROCEDURE — 3074F SYST BP LT 130 MM HG: CPT | Mod: CPTII,S$GLB,, | Performed by: INTERNAL MEDICINE

## 2023-08-07 PROCEDURE — 3074F PR MOST RECENT SYSTOLIC BLOOD PRESSURE < 130 MM HG: ICD-10-PCS | Mod: CPTII,S$GLB,, | Performed by: INTERNAL MEDICINE

## 2023-08-07 PROCEDURE — 3079F PR MOST RECENT DIASTOLIC BLOOD PRESSURE 80-89 MM HG: ICD-10-PCS | Mod: CPTII,S$GLB,, | Performed by: INTERNAL MEDICINE

## 2023-08-07 PROCEDURE — 1159F PR MEDICATION LIST DOCUMENTED IN MEDICAL RECORD: ICD-10-PCS | Mod: CPTII,S$GLB,, | Performed by: INTERNAL MEDICINE

## 2023-08-07 PROCEDURE — 1159F MED LIST DOCD IN RCRD: CPT | Mod: CPTII,S$GLB,, | Performed by: INTERNAL MEDICINE

## 2023-08-07 PROCEDURE — 3044F PR MOST RECENT HEMOGLOBIN A1C LEVEL <7.0%: ICD-10-PCS | Mod: CPTII,S$GLB,, | Performed by: INTERNAL MEDICINE

## 2023-08-07 PROCEDURE — 99213 OFFICE O/P EST LOW 20 MIN: CPT | Mod: S$GLB,,, | Performed by: INTERNAL MEDICINE

## 2023-08-07 PROCEDURE — 99213 PR OFFICE/OUTPT VISIT, EST, LEVL III, 20-29 MIN: ICD-10-PCS | Mod: S$GLB,,, | Performed by: INTERNAL MEDICINE

## 2023-08-07 PROCEDURE — 99999 PR PBB SHADOW E&M-EST. PATIENT-LVL III: ICD-10-PCS | Mod: PBBFAC,,, | Performed by: INTERNAL MEDICINE

## 2023-08-07 PROCEDURE — 99999 PR PBB SHADOW E&M-EST. PATIENT-LVL III: CPT | Mod: PBBFAC,,, | Performed by: INTERNAL MEDICINE

## 2023-08-07 PROCEDURE — 3008F PR BODY MASS INDEX (BMI) DOCUMENTED: ICD-10-PCS | Mod: CPTII,S$GLB,, | Performed by: INTERNAL MEDICINE

## 2023-08-07 PROCEDURE — 3044F HG A1C LEVEL LT 7.0%: CPT | Mod: CPTII,S$GLB,, | Performed by: INTERNAL MEDICINE

## 2023-08-07 RX ORDER — ADALIMUMAB 40MG/0.8ML
40 KIT SUBCUTANEOUS
Qty: 2 PEN | Refills: 11 | Status: SHIPPED | OUTPATIENT
Start: 2023-08-07 | End: 2023-08-08 | Stop reason: ALTCHOICE

## 2023-08-07 RX ORDER — PREDNISONE 5 MG/1
5 TABLET ORAL DAILY PRN
Qty: 30 TABLET | Refills: 6 | Status: SHIPPED | OUTPATIENT
Start: 2023-08-07 | End: 2024-01-23

## 2023-08-07 ASSESSMENT — ROUTINE ASSESSMENT OF PATIENT INDEX DATA (RAPID3)
PATIENT GLOBAL ASSESSMENT SCORE: 3
TOTAL RAPID3 SCORE: 2
PAIN SCORE: 3
FATIGUE SCORE: 1.1
MDHAQ FUNCTION SCORE: 0
PSYCHOLOGICAL DISTRESS SCORE: 0

## 2023-08-07 NOTE — PROGRESS NOTES
Subjective:       Patient ID: Rosalinda Rojo is a 42 y.o. male.    Chief Complaint: Disease Management    Follow up:42 year old male who presents clinic for follow up on Ankylosing spondylitis. He was not approved for Enbrel safetynet and has been out of medication approx 2 weeks. Prior to being off treatment, he was doing fair on Enbrel. He denies low back pain or stiffness. He has intermittent neck pain. He has chronic bilateral scapular pain since 2018. 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 He has tried bilateral shoulder injections without much relief. He tried trigger point injection with orthopedics, acupunture, and cupping without relief.  Flexeril seems to help some with this pain, but he would like further evaluation. He has intermittent L wrist pain.    Complains of itching in the ears.    We reviewed recent x-ray and labs.      Review of Systems   Constitutional:  Positive for activity change. Negative for chills.   Eyes:  Negative for visual disturbance.   Respiratory:  Negative for cough, shortness of breath and wheezing.    Cardiovascular:  Negative for chest pain, palpitations and leg swelling.   Gastrointestinal:  Negative for abdominal pain, constipation, diarrhea, nausea and vomiting.   Musculoskeletal:  Positive for back pain and joint swelling. Negative for arthralgias.   Skin:  Positive for rash.        Hair loss   Allergic/Immunologic: Positive for immunocompromised state.   Neurological:  Negative for dizziness and syncope.   Hematological:  Negative for adenopathy.         Objective:     Vitals:    08/07/23 1545   BP: 129/82   Pulse: 67       Past Medical History:   Diagnosis Date    Ankylosing spondylitis lumbar region     Ankylosing spondylitis lumbar region     Back pain     Bilateral Shoulder Strain     Wellness Visit 7/25/17      No past surgical history on file.       Physical Exam   Constitutional: He is oriented to person, place, and time.   Eyes:  Right conjunctiva is not injected. Left conjunctiva is not injected.   Neck: No JVD present. No thyromegaly present.   Cardiovascular: Normal rate.   Pulmonary/Chest: Effort normal.   Musculoskeletal:      Right shoulder: Normal.      Left shoulder: Normal.      Right elbow: Normal.      Left elbow: Normal.      Right wrist: Normal.      Left wrist: Normal.      Right knee: Normal.      Left knee: Normal.   Lymphadenopathy:     He has no cervical adenopathy.   Neurological: He is alert and oriented to person, place, and time. Gait normal.   Skin: No rash noted.   Psychiatric: Mood and affect normal.       Right Side Rheumatological Exam     Examination finds the shoulder, elbow, wrist, knee, 1st MCP, 2nd PIP, 2nd MCP, 3rd PIP, 3rd MCP, 4th PIP, 4th MCP, 5th PIP and 5th MCP normal.    The patient has an enlarged 1st PIP    Left Side Rheumatological Exam     Examination finds the shoulder, elbow, wrist, knee, 1st MCP, 2nd PIP, 2nd MCP, 3rd PIP, 3rd MCP, 4th PIP, 4th MCP, 5th PIP and 5th MCP normal.    The patient has an enlarged 1st PIP.      Back/Neck Exam     Comments:  +ttp of medial border scapula         Narrative & Impression  EXAMINATION:  XR SACROILIAC JOINTS 3 VIEWS     CLINICAL HISTORY:  Ankylosing spondylitis of unspecified sites in spine     TECHNIQUE:  SI joints AP and obliques.     COMPARISON:  None     FINDINGS:  Mild sclerosis appears to be present in each SI joint as can be seen with a history of sacroiliitis or degenerative change.     Impression:     See above      Results for orders placed or performed in visit on 04/05/23   Hepatitis C Antibody   Result Value Ref Range    Hepatitis C Ab Non-reactive Non-reactive   Quantiferon Gold TB   Result Value Ref Range    NIL 0.19572 IU/mL    TB1 - Nil 0.000 IU/mL    TB2 - Nil 0.000 IU/mL    Mitogen - Nil 10.000 IU/mL    TB Gold Plus Negative Negative   Hepatitis B Surface Antigen   Result Value Ref Range    Hepatitis B Surface Ag Non-reactive Non-reactive    HEPATITIS B SURFACE ANTIBODY   Result Value Ref Range    Hep B S Ab <3.00 mIU/mL    Hep B S Ab Non-reactive    Hepatitis B Core Antibody, Total   Result Value Ref Range    Hep B Core Total Ab Non-reactive Non-reactive   Sedimentation rate   Result Value Ref Range    Sed Rate 5 0 - 23 mm/Hr   C-REACTIVE PROTEIN   Result Value Ref Range    CRP <0.3 0.0 - 8.2 mg/L   HLA B27 ANTIGEN   Result Value Ref Range    HLA B27 Interpretation SAPE: 212  TAQ: [id number]       B27 Testing Date 04/13/2023 09:54 AM     HLA B27 Result Positive        Assessment:       1. Ankylosing spondylitis of lumbar region    2. Chronic scapular pain    3. High risk medication use    4. Chronic pain of both shoulders              Plan:       Ankylosing spondylitis of lumbar region  -     Ambulatory referral/consult to Orthopedics; Future; Expected date: 08/14/2023  -     Discontinue: adalimumab (HUMIRA PEN) PnKt injection; Inject 1 pen  (40 mg total) into the skin every 14 (fourteen) days.  Dispense: 2 pen ; Refill: 11  -     predniSONE (DELTASONE) 5 MG tablet; Take 1 tablet (5 mg total) by mouth daily as needed.  Dispense: 30 tablet; Refill: 6    Chronic scapular pain  -     Ambulatory referral/consult to Orthopedics; Future; Expected date: 08/14/2023  -     predniSONE (DELTASONE) 5 MG tablet; Take 1 tablet (5 mg total) by mouth daily as needed.  Dispense: 30 tablet; Refill: 6    High risk medication use  -     Ambulatory referral/consult to Orthopedics; Future; Expected date: 08/14/2023  -     predniSONE (DELTASONE) 5 MG tablet; Take 1 tablet (5 mg total) by mouth daily as needed.  Dispense: 30 tablet; Refill: 6    Chronic pain of both shoulders  -     Ambulatory referral/consult to Orthopedics; Future; Expected date: 08/14/2023  -     predniSONE (DELTASONE) 5 MG tablet; Take 1 tablet (5 mg total) by mouth daily as needed.  Dispense: 30 tablet; Refill: 6          Assessment:  42 year old male with  Ankylosing spondylitis (+HLBA27)  --pre diabetes,  HgbA1c 5.6%  --hyperlipidemia      Plan:  Cont Enbrel weekly. Until humira approved  Cont flexeril prn  Start diclofenac   Start lidex prn  Referral to pain management or consider PT with dry needling  Changed to humira    More than 50% of the  30 minute encounter was spent face to face counseling the patient regarding current status and future plan of care as well as side effects  of the medications. All questions were answered to patient's satisfaction also includes  non-face to face time preparing to see the patient (eg, review of tests), Obtaining and/or reviewing separately obtained history, Documenting clinical information in the electronic or other health record, Independently interpreting results

## 2023-08-14 NOTE — TELEPHONE ENCOUNTER
Specialty Pharmacy - Refill Coordination    Specialty Medication Orders Linked to Encounter      Flowsheet Row Most Recent Value   Medication #1 etanercept (ENBREL SURECLICK) 50 mg/mL (1 mL) (Order#[id number], Rx#[id number])            Refill Questions - Documented Responses      Flowsheet Row Most Recent Value   Patient Availability and HIPAA Verification    Does patient want to proceed with activity? Yes   HIPAA/medical authority confirmed? Yes   Relationship to patient of person spoken to? Self   Refill Screening Questions    Changes to allergies? No   Changes to medications? No   New conditions since last clinic visit? No   Unplanned office visit, urgent care, ED, or hospital admission in the last 4 weeks? No   How does patient/caregiver feel medication is working? Good   Financial problems or insurance changes? No   How many doses of your specialty medications were missed in the last 4 weeks? 1   Why were doses missed? Simply forgot   Would patient like to speak to a pharmacist? No   When does the patient need to receive the medication? 08/21/23   Refill Delivery Questions    How will the patient receive the medication? MEDRx   When does the patient need to receive the medication? 08/21/23   Shipping Address Home   Address in [facility] confirmed and updated if neccessary? Yes   Expected Copay ($) 2292.07   Is the patient able to afford the medication copay? Yes   Payment Method  CC on file   Days supply of Refill 28   Supplies needed? No supplies needed   Refill activity completed? Yes   Refill activity plan Refill scheduled   Shipment/Pickup Date: 08/17/23            Current Outpatient Medications   Medication Sig    etanercept (ENBREL SURECLICK) 50 mg/mL (1 mL) Inject 1 mL (50 mg total) into the skin once a week.    fluocinonide (LIDEX) 0.05 % external solution Apply thin layer to affected area twice daily as needed    GARDASIL 9, PF, 0.5 mL Syrg     metFORMIN (GLUCOPHAGE) 500 MG tablet  Take 1 tablet (500 mg total) by mouth daily with breakfast.    predniSONE (DELTASONE) 5 MG tablet Take 1 tablet (5 mg total) by mouth daily as needed.   Last reviewed on 8/7/2023  3:46 PM by Dannielle Fajardo MA    Review of patient's allergies indicates:  No Known Allergies Last reviewed on  8/7/2023 5:56 PM by Nikko Ugarte      Tasks added this encounter   No tasks added.   Tasks due within next 3 months   No tasks due.     Blaine, PharmD  [facility]  [address] [place] [id number]  Phone: [phone]  Fax: [phone]

## 2023-08-15 ENCOUNTER — HOSPITAL ENCOUNTER (OUTPATIENT)
Dept: RADIOLOGY | Facility: HOSPITAL | Age: 43
Discharge: HOME | End: 2023-08-15
Attending: ANESTHESIOLOGY
Payer: COMMERCIAL

## 2023-08-15 ENCOUNTER — OFFICE VISIT (OUTPATIENT)
Dept: PAIN MEDICINE | Facility: CLINIC | Age: 43
End: 2023-08-15
Payer: COMMERCIAL

## 2023-08-15 VITALS
WEIGHT: 167.13 LBS | DIASTOLIC BLOOD PRESSURE: 83 MMHG | HEART RATE: 57 BPM | SYSTOLIC BLOOD PRESSURE: 114 MMHG | BODY MASS INDEX: 23.93 KG/M2 | HEIGHT: 70 IN

## 2023-08-15 DIAGNOSIS — M77.12 LATERAL EPICONDYLITIS OF LEFT ELBOW: ICD-10-CM

## 2023-08-15 DIAGNOSIS — M89.8X1 CHRONIC SCAPULAR PAIN: ICD-10-CM

## 2023-08-15 DIAGNOSIS — M79.18 MYOFASCIAL PAIN: Primary | ICD-10-CM

## 2023-08-15 DIAGNOSIS — G89.29 CHRONIC SCAPULAR PAIN: ICD-10-CM

## 2023-08-15 PROCEDURE — 99999 PR PBB SHADOW E&M-EST. PATIENT-LVL IV: ICD-10-PCS | Mod: PBBFAC,,, | Performed by: ANESTHESIOLOGY

## 2023-08-15 PROCEDURE — 73080 XR ELBOW COMPLETE 3 VIEW LEFT: ICD-10-PCS | Mod: 26,LT,, | Performed by: RADIOLOGY

## 2023-08-15 PROCEDURE — 99999 PR PBB SHADOW E&M-EST. PATIENT-LVL IV: CPT | Mod: PBBFAC,,, | Performed by: ANESTHESIOLOGY

## 2023-08-15 PROCEDURE — 3044F PR MOST RECENT HEMOGLOBIN A1C LEVEL <7.0%: ICD-10-PCS | Mod: CPTII,S$GLB,, | Performed by: ANESTHESIOLOGY

## 2023-08-15 PROCEDURE — 1160F PR REVIEW ALL MEDS BY PRESCRIBER/CLIN PHARMACIST DOCUMENTED: ICD-10-PCS | Mod: CPTII,S$GLB,, | Performed by: ANESTHESIOLOGY

## 2023-08-15 PROCEDURE — 3008F BODY MASS INDEX DOCD: CPT | Mod: CPTII,S$GLB,, | Performed by: ANESTHESIOLOGY

## 2023-08-15 PROCEDURE — 3074F SYST BP LT 130 MM HG: CPT | Mod: CPTII,S$GLB,, | Performed by: ANESTHESIOLOGY

## 2023-08-15 PROCEDURE — 99204 OFFICE O/P NEW MOD 45 MIN: CPT | Mod: S$GLB,,, | Performed by: ANESTHESIOLOGY

## 2023-08-15 PROCEDURE — 1159F PR MEDICATION LIST DOCUMENTED IN MEDICAL RECORD: ICD-10-PCS | Mod: CPTII,S$GLB,, | Performed by: ANESTHESIOLOGY

## 2023-08-15 PROCEDURE — 3008F PR BODY MASS INDEX (BMI) DOCUMENTED: ICD-10-PCS | Mod: CPTII,S$GLB,, | Performed by: ANESTHESIOLOGY

## 2023-08-15 PROCEDURE — 1160F RVW MEDS BY RX/DR IN RCRD: CPT | Mod: CPTII,S$GLB,, | Performed by: ANESTHESIOLOGY

## 2023-08-15 PROCEDURE — 73080 X-RAY EXAM OF ELBOW: CPT | Mod: TC,FY,PO,LT

## 2023-08-15 PROCEDURE — 73080 X-RAY EXAM OF ELBOW: CPT | Mod: 26,LT,, | Performed by: RADIOLOGY

## 2023-08-15 PROCEDURE — 3044F HG A1C LEVEL LT 7.0%: CPT | Mod: CPTII,S$GLB,, | Performed by: ANESTHESIOLOGY

## 2023-08-15 PROCEDURE — 3074F PR MOST RECENT SYSTOLIC BLOOD PRESSURE < 130 MM HG: ICD-10-PCS | Mod: CPTII,S$GLB,, | Performed by: ANESTHESIOLOGY

## 2023-08-15 PROCEDURE — 99204 PR OFFICE/OUTPT VISIT, NEW, LEVL IV, 45-59 MIN: ICD-10-PCS | Mod: S$GLB,,, | Performed by: ANESTHESIOLOGY

## 2023-08-15 PROCEDURE — 3079F DIAST BP 80-89 MM HG: CPT | Mod: CPTII,S$GLB,, | Performed by: ANESTHESIOLOGY

## 2023-08-15 PROCEDURE — 3079F PR MOST RECENT DIASTOLIC BLOOD PRESSURE 80-89 MM HG: ICD-10-PCS | Mod: CPTII,S$GLB,, | Performed by: ANESTHESIOLOGY

## 2023-08-15 PROCEDURE — 1159F MED LIST DOCD IN RCRD: CPT | Mod: CPTII,S$GLB,, | Performed by: ANESTHESIOLOGY

## 2023-08-15 RX ORDER — METHOCARBAMOL 500 MG/1
500 TABLET, FILM COATED ORAL 2 TIMES DAILY PRN
Qty: 30 TABLET | Refills: 0 | Status: SHIPPED | OUTPATIENT
Start: 2023-08-15 | End: 2024-01-23

## 2023-08-15 NOTE — PROGRESS NOTES
[facility] New Patient Evaluation      Referred by: Simin Smith    PCP:     CC:   Chief Complaint   Patient presents with    Shoulder Pain    Elbow Pain    Arm Pain      No flowsheet data found.      HPI:   Rosalinda Rojo is a 42 y.o. male patient who has a past medical history of Ankylosing spondylitis lumbar region, Ankylosing spondylitis lumbar region, Back pain, Bilateral Shoulder Strain, and Wellness Visit 7/25/17. He presents with pain over the bilateral shoulder and scapular region and left elbow pain.  He has been dealing with this for over 5 years.  Today he rates his pain as 3/10, constant, aching, sharp.  He denies any radicular pain.  No numbness or weakness.      Pain Intervention History:      Past Spine Surgical History:      Past and current medications:  Antineuropathics:  NSAIDs:  Physical therapy: yes, completed in the past with dry needling  Antidepressants:  Muscle relaxers: robaxin  Opioids:  Antiplatelets/Anticoagulants:    History:    Current Outpatient Medications:     etanercept (ENBREL SURECLICK) 50 mg/mL (1 mL), Inject 1 mL (50 mg total) into the skin once a week., Disp: 12 mL, Rfl: 3    fluocinonide (LIDEX) 0.05 % external solution, Apply thin layer to affected area twice daily as needed, Disp: 60 mL, Rfl: 0    GARDASIL 9, PF, 0.5 mL Syrg, , Disp: , Rfl:     metFORMIN (GLUCOPHAGE) 500 MG tablet, Take 1 tablet (500 mg total) by mouth daily with breakfast., Disp: 90 tablet, Rfl: 1    predniSONE (DELTASONE) 5 MG tablet, Take 1 tablet (5 mg total) by mouth daily as needed., Disp: 30 tablet, Rfl: 6    methocarbamoL (ROBAXIN) 500 MG Tab, Take 1 tablet (500 mg total) by mouth 2 (two) times daily as needed., Disp: 30 tablet, Rfl: 0    Past Medical History:   Diagnosis Date    Ankylosing spondylitis lumbar region     Ankylosing spondylitis lumbar region     Back pain     Bilateral Shoulder Strain     Wellness Visit 7/25/17        History reviewed. No pertinent surgical  "history.    Family History   Problem Relation Age of Onset    Thyroid disease Mother     Diabetes Mellitus Father     Heart disease Father        Social History     Socioeconomic History    Marital status:    Tobacco Use    Smoking status: Never    Smokeless tobacco: Never   Substance and Sexual Activity    Alcohol use: No    Drug use: No    Sexual activity: Yes     Partners: Female       Review of patient's allergies indicates:  No Known Allergies    Review of Systems:  12 point review of systems is negative.    Physical Exam:  Vitals:    08/15/23 1045   BP: 114/83   Pulse: (!) 57   Weight: 75.8 kg (167 lb 1.7 oz)   Height: 5' 10" (1.778 m)   PainSc:   3   PainLoc: Shoulder     Body mass index is 23.98 kg/m².    Gen: NAD  Psych: mood appropriate for given condition  HEENT: eyes anicteric   CV: RRR  HEENT: anicteric   Respiratory: non-labored, no signs of respiratory distress  Abd: non-distended  Skin: warm, dry and intact.  Gait: No antalgic gait.     Some pain with empty can left greater than right.  Neer's negative.  Spurling's negative.  Tenderness over the bilateral posterior trapezius    Sensory:  Intact and symmetrical to light touch in C4-T1 dermatomes bilaterally    Motor:    Right Left   C4 Shoulder Abduction  5  5   C5 Elbow Flexion    5  5   C6 Wrist Extension  5  5   C7 Elbow Extension   5  5   C8/T1 Hand Intrinsics   5  5      Right Left   Triceps DTR 0 1+   Biceps DTR 1+ 1+        Patellar DTR 1+ 1+   Achilles DTR 1+ 1+   Ferro Absent  Absent                 Labs:  Lab Results   Component Value Date    HGBA1C 6.4 (H) 03/30/2023       Lab Results   Component Value Date    WBC 5.88 03/30/2023    HGB 14.3 03/30/2023    HCT 45.2 03/30/2023    MCV 94 03/30/2023     03/30/2023           Imaging:  MRI left shoulder 5/12/22  Impression:  1. Mild supraspinatus tendinosis is seen.  2. Mild infraspinatus tendinosis is noted.  3. Trace subacromial-subdeltoid bursal fluid is seen which may be " inflammatory in nature as there is no significant subacromial-subdeltoid contrast extravasation.  4. Small amount of contrast along the anterior aspect of the shoulder superficial to the subscapularis and coracoid process is presumably from anterior approach from arthrogram.  There is also contrast seen along the deep fibers of the subscapularis muscle and anterior aspect of the scapula of uncertain etiology.  The less likely possibility of anterior joint capsule injury cannot be entirely excluded given the anterior approach for arthrogram.  5. Irregular fluid signal is seen without significant increased T1 signal from contrast along the bursal surface of the subscapularis tendon and myotendinous junction as well as interstitial fluid signal intensity along the subscapularis tendon and myotendinous junction.  This could be from initial Omnipaque injection to stab wish intra-articular position of the needle via anterior approach for arthrogram, but the possibility of bursal surface and interstitial tearing involving the subscapularis tendon and myotendinous junction is not excluded.  This could be clarified with repeat non-arthrographic MRI of the shoulder once the contrast has dissipated.  6. Moderate hypertrophic AC joint arthritic changes are seen.  7. Additional findings and details as above.    Assessment:   Problem List Items Addressed This Visit    None  Visit Diagnoses       Myofascial pain    -  Primary    Relevant Medications    methocarbamoL (ROBAXIN) 500 MG Tab    Chronic scapular pain        Lateral epicondylitis of left elbow        Relevant Orders    X-Ray Elbow 2 Views Left              Rosalinda Rojo is a 42 y.o. male patient who has a past medical history of Ankylosing spondylitis lumbar region, Ankylosing spondylitis lumbar region, Back pain, Bilateral Shoulder Strain, and Wellness Visit 7/25/17. He presents with pain over the bilateral shoulder and scapular region and left elbow pain.  He has been  dealing with this for over 5 years.  Today he rates his pain as 3/10, constant, aching, sharp.  He denies any radicular pain.  No numbness or weakness.    - on exam he has full strength of his upper extremities and intact sensation to light touch bilateral C4-T1.  Spurling's negative.  No pain with Neer's bilaterally.  Pain left-greater-than-right with empty can.  Tenderness to palpation over posterior trapezius  - I discussed that I think he primarily has myofascial and soft tissue pain.  He has a history of right rotator cuff 5 years ago and declined surgery at that time.  More recent MRI of the left shoulder with some nonspecific inflammation and moderate hypertrophic AC arthritic changes  - he reports he has done physical therapy last year with dry needling without significant improvement in his symptoms  - I prescribed her some Robaxin to use 1 to 2 times a day as needed for the myofascial component of his pain.  He understands side effects include drowsiness and not to drive while taking this medication  - he has had corticosteroid injections of the left elbow in the past.  Reports 1st injection worked really well but subsequent to did not.  I got an x-ray of his left elbow.  I think he is suffering from lateral epicondylitis.  I think that his shoulder pain again is likely soft tissue mediated but also may be mediated from the surrounding anatomy from his shoulders.  I do not think that is related to cervical spine.  I have recommended he follow-up with a sports medicine physician to discuss further treatment options that he may benefit from.  I've placed a referral for him.       : Not applicable    Paul Weathers M.D.  Interventional Pain Medicine / Anesthesiology    This note was completed with dictation software and grammatical errors may exist.

## 2024-01-25 ENCOUNTER — TELEPHONE (OUTPATIENT)
Dept: RESEARCH | Facility: OTHER | Age: 44
End: 2024-01-25

## 2024-01-25 NOTE — TELEPHONE ENCOUNTER
Study Title: Transcending COVID-19 barriers to pain care in [place]: Pragmatic comparative effectiveness trial of evidence-based, on-demand, digital behavioral treatments for chronic pain.  Sponsor:  [facility]   Study: [facility] Digital Pain Treatment Study - Transcending COVID-19 barriers to pain care in [place]: Pragmatic comparative effectiveness trial of evidence-based, on-demand, digital behavioral treatments for chronic pain.   IRB/Protocol #: 2021.177 - Phase 2?  Study#([facility]):  [id number]  Principle Investigator - Kun Tucker   Sub-Investigator - Marko Griffin   Sponsor:  [facility] Screening Interest in Study Call    Attempt #: 1, 2, 3+: 1      1. Contact Made: [x]Yes []No   1A. If yes, contact date: [id number]  1B. If no, date of final contact attempt: [id number]  1C. If no, reason(s) contact not made:  []Wrong number []No response []Other   1D. If other, please specify: not interested    2. Verbal commitment: []Yes  [x]No  2A. If yes, verbal commitment date:   2B. If no, reason: []Exclusion Criteria Met  [x]Desire not to participate []No reason provided []Other  2B1. If Type of Exclusion Criteria Met or other reason, specify: not interested    I called & spoke with the subject in detail about the research study. The subject states that we can not help him with his pain because he needs surgery.

## 2024-05-07 ENCOUNTER — OFFICE VISIT (OUTPATIENT)
Dept: RHEUMATOLOGY | Facility: CLINIC | Age: 44
End: 2024-05-07
Payer: COMMERCIAL

## 2024-05-07 VITALS
DIASTOLIC BLOOD PRESSURE: 75 MMHG | HEART RATE: 86 BPM | HEIGHT: 70 IN | SYSTOLIC BLOOD PRESSURE: 113 MMHG | BODY MASS INDEX: 23.34 KG/M2 | WEIGHT: 163 LBS

## 2024-05-07 DIAGNOSIS — R19.7 DIARRHEA, UNSPECIFIED TYPE: ICD-10-CM

## 2024-05-07 DIAGNOSIS — E78.5 HYPERLIPIDEMIA, UNSPECIFIED HYPERLIPIDEMIA TYPE: ICD-10-CM

## 2024-05-07 DIAGNOSIS — M45.6 ANKYLOSING SPONDYLITIS OF LUMBAR REGION: Primary | ICD-10-CM

## 2024-05-07 DIAGNOSIS — Z12.5 SCREENING FOR PROSTATE CANCER: ICD-10-CM

## 2024-05-07 PROCEDURE — 3044F HG A1C LEVEL LT 7.0%: CPT | Mod: CPTII,S$GLB,, | Performed by: PHYSICIAN ASSISTANT

## 2024-05-07 PROCEDURE — 3074F SYST BP LT 130 MM HG: CPT | Mod: CPTII,S$GLB,, | Performed by: PHYSICIAN ASSISTANT

## 2024-05-07 PROCEDURE — 99999 PR PBB SHADOW E&M-EST. PATIENT-LVL III: CPT | Mod: PBBFAC,,, | Performed by: PHYSICIAN ASSISTANT

## 2024-05-07 PROCEDURE — 1159F MED LIST DOCD IN RCRD: CPT | Mod: CPTII,S$GLB,, | Performed by: PHYSICIAN ASSISTANT

## 2024-05-07 PROCEDURE — 99214 OFFICE O/P EST MOD 30 MIN: CPT | Mod: S$GLB,,, | Performed by: PHYSICIAN ASSISTANT

## 2024-05-07 PROCEDURE — 3008F BODY MASS INDEX DOCD: CPT | Mod: CPTII,S$GLB,, | Performed by: PHYSICIAN ASSISTANT

## 2024-05-07 PROCEDURE — 3078F DIAST BP <80 MM HG: CPT | Mod: CPTII,S$GLB,, | Performed by: PHYSICIAN ASSISTANT

## 2024-05-07 PROCEDURE — 1160F RVW MEDS BY RX/DR IN RCRD: CPT | Mod: CPTII,S$GLB,, | Performed by: PHYSICIAN ASSISTANT

## 2024-05-07 RX ORDER — ADALIMUMAB 40MG/0.4ML
40 KIT SUBCUTANEOUS
Qty: 2 PEN | Refills: 11 | Status: ACTIVE | OUTPATIENT
Start: 2024-05-07

## 2024-05-07 NOTE — PROGRESS NOTES
Subjective:       Patient ID: Rosalinda Rojo is a 43 y.o. male.    Chief Complaint: Disease Management    Mr. Rojo is a 43 year old male who presents clinic for follow up on Ankylosing spondylitis. He is doing fair on Enbrel week. He reports missing doses because he forgets. He continues to have intermittent elbow pain secondary to lateral epicondylitis. He received steroid injections from Ortho since last visit without long lasting relief. He has chronic bilateral scapular pain since 2018. 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 He has tried bilateral shoulder injections without much relief. He tried trigger point injection with orthopedics, acupunture, and cupping without relief.    He also complains of intermittent diarrhea after eating certain foods.    He is due for labs.       Review of Systems   Constitutional:  Positive for activity change. Negative for chills, fatigue, fever and unexpected weight change.   HENT:  Negative for mouth sores and trouble swallowing.    Eyes:  Negative for redness and visual disturbance.   Respiratory:  Negative for cough, shortness of breath and wheezing.    Cardiovascular:  Negative for chest pain, palpitations and leg swelling.   Gastrointestinal:  Positive for diarrhea. Negative for abdominal pain, constipation, nausea and vomiting.   Genitourinary:  Negative for genital sores.   Musculoskeletal:  Positive for back pain and myalgias. Negative for arthralgias.   Skin:  Negative for rash.        Hair loss   Allergic/Immunologic: Positive for immunocompromised state.   Neurological:  Negative for dizziness, syncope and headaches.   Hematological:  Negative for adenopathy. Does not bruise/bleed easily.         Objective:     Vitals:    05/07/24 1609   BP: 113/75   Pulse: 86         Past Medical History:   Diagnosis Date    Ankylosing spondylitis lumbar region     Ankylosing spondylitis lumbar region     Back pain     Bilateral Shoulder Strain      Wellness Visit 7/25/17      History reviewed. No pertinent surgical history.       Physical Exam   Constitutional: He is oriented to person, place, and time.   Eyes: Right conjunctiva is not injected. Left conjunctiva is not injected.   Neck: No JVD present. No thyromegaly present.   Cardiovascular: Normal rate.   Pulmonary/Chest: Effort normal.   Musculoskeletal:      Right shoulder: Normal.      Left shoulder: Normal.      Right elbow: Normal.      Left elbow: Normal.      Right wrist: Normal.      Left wrist: Normal.      Right knee: Normal.      Left knee: Normal.   Lymphadenopathy:     He has no cervical adenopathy.   Neurological: He is alert and oriented to person, place, and time. Gait normal.   Skin: No rash noted.   Psychiatric: Mood and affect normal.       Right Side Rheumatological Exam     Examination finds the shoulder, elbow, wrist, knee, 1st MCP, 2nd PIP, 2nd MCP, 3rd PIP, 3rd MCP, 4th PIP, 4th MCP, 5th PIP and 5th MCP normal.    The patient has an enlarged 1st PIP    Left Side Rheumatological Exam     Examination finds the shoulder, elbow, wrist, knee, 1st MCP, 2nd PIP, 2nd MCP, 3rd PIP, 3rd MCP, 4th PIP, 4th MCP, 5th PIP and 5th MCP normal.    The patient has an enlarged 1st PIP.      Back/Neck Exam     Comments:  +ttp of medial border scapula        Labs:  Component      Latest Ref Rng 3/14/2024   Hemoglobin A1C External      0.0 - 5.6 % 6.5 (H)    Estimated Avg Glucose      68 - 131 mg/dL 140 (H)       Legend:  (H) High  Assessment:       1. Ankylosing spondylitis of lumbar region    2. Diarrhea, unspecified type    3. Hyperlipidemia, unspecified hyperlipidemia type    4. Screening for prostate cancer              Plan:       Ankylosing spondylitis of lumbar region  -     CBC Auto Differential; Future; Expected date: 05/07/2024  -     Comprehensive Metabolic Panel; Future; Expected date: 05/07/2024  -     C-Reactive Protein; Future; Expected date: 05/07/2024  -     Sedimentation rate; Future;  Expected date: 05/07/2024  -     Hepatitis B Surface Antigen; Future; Expected date: 05/07/2024  -     Hepatitis C Antibody; Future; Expected date: 05/07/2024  -     Quantiferon Gold TB; Future; Expected date: 05/07/2024  -     Hepatitis B Core Antibody, Total; Future; Expected date: 05/07/2024  -     adalimumab (HUMIRA,CF, PEN) 40 mg/0.4 mL PnKt; Inject 0.4 mLs (40 mg total) into the skin every 14 (fourteen) days.  Dispense: 2 pen ; Refill: 11    Diarrhea, unspecified type  -     Ambulatory referral/consult to Gastroenterology; Future; Expected date: 05/14/2024    Hyperlipidemia, unspecified hyperlipidemia type  -     Cancel: Lipid Panel; Future; Expected date: 05/07/2024  -     LIPID PANEL; Future; Expected date: 05/07/2024    Screening for prostate cancer  -     PSA, SCREENING; Future; Expected date: 05/07/2024            43 year old male with  Ankylosing spondylitis (+[id number])  --pre diabetes, HgbA1c 6.5%  --hyperlipidemia      Plan:  D/c Enbrel . Start Humira every 14 days  Check labs this week, pt asked if I could include annual labs with the orders  Referral to GI for diarrhea evaluation    Follow up:  4.5 mo Dr. Ugarte

## 2024-05-13 ENCOUNTER — OFFICE VISIT (OUTPATIENT)
Dept: GASTROENTEROLOGY | Facility: CLINIC | Age: 44
End: 2024-05-13
Payer: COMMERCIAL

## 2024-05-13 VITALS — BODY MASS INDEX: 23.35 KG/M2 | WEIGHT: 163.13 LBS | HEIGHT: 70 IN

## 2024-05-13 DIAGNOSIS — R63.4 WEIGHT LOSS: ICD-10-CM

## 2024-05-13 DIAGNOSIS — R19.7 INTERMITTENT DIARRHEA: Primary | ICD-10-CM

## 2024-05-13 PROBLEM — L40.9 PSORIASIS: Status: ACTIVE | Noted: 2024-05-13

## 2024-05-13 PROBLEM — Z78.9 NON-SMOKER: Status: ACTIVE | Noted: 2024-05-13

## 2024-05-13 PROBLEM — M47.819 SPONDYLOARTHROPATHY: Status: ACTIVE | Noted: 2024-05-13

## 2024-05-13 PROBLEM — J45.909 AIRWAY HYPERREACTIVITY: Status: ACTIVE | Noted: 2024-05-13

## 2024-05-13 PROBLEM — M22.40: Status: ACTIVE | Noted: 2024-05-13

## 2024-05-13 PROBLEM — R05.3 CHRONIC COUGH: Status: ACTIVE | Noted: 2024-05-13

## 2024-05-13 PROCEDURE — 99203 OFFICE O/P NEW LOW 30 MIN: CPT | Mod: S$GLB,,, | Performed by: NURSE PRACTITIONER

## 2024-05-13 PROCEDURE — 3008F BODY MASS INDEX DOCD: CPT | Mod: CPTII,S$GLB,, | Performed by: NURSE PRACTITIONER

## 2024-05-13 PROCEDURE — 99999 PR PBB SHADOW E&M-EST. PATIENT-LVL III: CPT | Mod: PBBFAC,,, | Performed by: NURSE PRACTITIONER

## 2024-05-13 PROCEDURE — 3044F HG A1C LEVEL LT 7.0%: CPT | Mod: CPTII,S$GLB,, | Performed by: NURSE PRACTITIONER

## 2024-05-13 PROCEDURE — 1160F RVW MEDS BY RX/DR IN RCRD: CPT | Mod: CPTII,S$GLB,, | Performed by: NURSE PRACTITIONER

## 2024-05-13 PROCEDURE — 1159F MED LIST DOCD IN RCRD: CPT | Mod: CPTII,S$GLB,, | Performed by: NURSE PRACTITIONER

## 2024-05-13 NOTE — PROGRESS NOTES
Subjective:       Patient ID: Rosalinda Rojo is a 43 y.o. male Body mass index is 23.41 kg/m².    Chief Complaint: Diarrhea    This patient is new to me.  Referring Provider: Gabriela Andersen for diarrhea.     GI Problem  The primary symptoms include weight loss (lost ~5 lbs over the past few months, stable overall) and diarrhea. Primary symptoms do not include fever, fatigue, abdominal pain, nausea, vomiting, melena, hematemesis, jaundice, hematochezia or dysuria.   The diarrhea began more than 1 week ago (started ~3 weeks ago). Daily occurrences: intermittent diarrhea, has occurred ~5 times; triggered after he eats out at restaurants, has 1-2 loose stools when it occurs; otherwise, bowel movements are daily of formed stool. Risk factors: ill contacts (wife has had diarrhea as well); metformin use restarted in 3/2024; denies recent antibiotic/hospitalization, foreign travel (Got back from [place] in 5/2023), or suspect food intake.   The illness does not include chills, dysphagia, odynophagia or constipation. Associated symptoms comments: TREATMENT: OTC chinese medication for diarrhea PRN- reports works great. Associated medical issues do not include inflammatory bowel disease or GERD.     Review of Systems   Constitutional:  Positive for weight loss (lost ~5 lbs over the past few months, stable overall). Negative for appetite change, chills, fatigue and fever.        Eating 3 meals a day   HENT:  Negative for sore throat and trouble swallowing.    Respiratory:  Negative for cough, choking and shortness of breath.    Cardiovascular:  Negative for chest pain.   Gastrointestinal:  Positive for diarrhea. Negative for abdominal pain, anal bleeding, blood in stool, constipation, dysphagia, hematemesis, hematochezia, jaundice, melena, nausea, rectal pain and vomiting.        Reports had colonoscopy in [place] in 2023 for screening   Genitourinary:  Negative for difficulty urinating, dysuria and flank pain.   Neurological:   Negative for weakness.       Past Medical History:   Diagnosis Date    Ankylosing spondylitis lumbar region     Back pain     Bilateral Shoulder Strain     Wellness Visit 7/25/17      Past Surgical History:   Procedure Laterality Date    COLONOSCOPY  2023    done in [place], reports normal findings per patient report    UPPER GASTROINTESTINAL ENDOSCOPY  2023    done in [place], normal findings per patient report     Family History   Problem Relation Name Age of Onset    Thyroid disease Mother      Diabetes Mellitus Father      Heart disease Father      Colon cancer Neg Hx      Crohn's disease Neg Hx      Ulcerative colitis Neg Hx      Celiac disease Neg Hx       Social History     Tobacco Use    Smoking status: Never    Smokeless tobacco: Never   Substance Use Topics    Alcohol use: Yes     Alcohol/week: 7.0 standard drinks of alcohol     Types: 7 Drinks containing 0.5 oz of alcohol per week    Drug use: No     Wt Readings from Last 10 Encounters:   05/13/24 74 kg (163 lb 2.3 oz)   05/07/24 73.9 kg (163 lb)   01/23/24 77.1 kg (170 lb)   09/21/23 75.8 kg (167 lb)   08/15/23 75.8 kg (167 lb 1.7 oz)   08/07/23 75.8 kg (167 lb)   06/02/23 73.9 kg (163 lb)   04/04/23 73.9 kg (162 lb 14.7 oz)   03/30/23 73.9 kg (163 lb)   01/18/23 74.1 kg (163 lb 4.8 oz)     Lab Results   Component Value Date    WBC 5.88 03/30/2023    HGB 14.3 03/30/2023    HCT 45.2 03/30/2023    MCV 94 03/30/2023     03/30/2023     CMP  Sodium   Date Value Ref Range Status   03/30/2023 139 136 - 145 mmol/L Final     Potassium   Date Value Ref Range Status   03/30/2023 4.1 3.5 - 5.1 mmol/L Final     Chloride   Date Value Ref Range Status   03/30/2023 102 95 - 110 mmol/L Final     CO2   Date Value Ref Range Status   03/30/2023 31 22 - 31 mmol/L Final     Glucose   Date Value Ref Range Status   03/30/2023 129 (H) 70 - 110 mg/dL Final     Comment:     The ADA recommends the following guidelines for fasting glucose:    Normal:       less than 100  mg/dL    Prediabetes:  100 mg/dL to 125 mg/dL    Diabetes:     126 mg/dL or higher       BUN   Date Value Ref Range Status   03/30/2023 24 (H) 9 - 21 mg/dL Final     Creatinine   Date Value Ref Range Status   03/30/2023 0.86 0.50 - 1.40 mg/dL Final     Calcium   Date Value Ref Range Status   03/30/2023 9.3 8.4 - 10.2 mg/dL Final     Total Protein   Date Value Ref Range Status   03/30/2023 7.2 6.0 - 8.4 g/dL Final     Albumin   Date Value Ref Range Status   03/30/2023 4.4 3.5 - 5.2 g/dL Final     Total Bilirubin   Date Value Ref Range Status   03/30/2023 0.7 0.2 - 1.3 mg/dL Final     Alkaline Phosphatase   Date Value Ref Range Status   03/30/2023 37 (L) 38 - 145 U/L Final     AST   Date Value Ref Range Status   03/30/2023 28 17 - 59 U/L Final     ALT   Date Value Ref Range Status   03/30/2023 27 0 - 50 U/L Final     Anion Gap   Date Value Ref Range Status   03/30/2023 6 (L) 8 - 16 mmol/L Final     eGFR if    Date Value Ref Range Status   02/15/2022 >60 >60 mL/min/1.73 m^2 Final   02/15/2022 >60 >60 mL/min/1.73 m^2 Final     eGFR if non    Date Value Ref Range Status   02/15/2022 >60 >60 mL/min/1.73 m^2 Final     Comment:     Calculation used to obtain the estimated glomerular filtration  rate (eGFR) is the CKD-EPI equation.      02/15/2022 >60 >60 mL/min/1.73 m^2 Final     Comment:     Calculation used to obtain the estimated glomerular filtration  rate (eGFR) is the CKD-EPI equation.        Lab Results   Component Value Date    TSH 2.030 07/25/2017     Objective:      Physical Exam  Vitals and nursing note reviewed.   Constitutional:       General: He is not in acute distress.     Appearance: Normal appearance. He is well-developed. He is not diaphoretic.   HENT:      Mouth/Throat:      Lips: Pink. No lesions.      Mouth: Mucous membranes are moist. No oral lesions.      Tongue: No lesions.      Pharynx: Oropharynx is clear. No pharyngeal swelling or posterior oropharyngeal erythema.    Eyes:      General: No scleral icterus.     Conjunctiva/sclera: Conjunctivae normal.   Pulmonary:      Effort: Pulmonary effort is normal. No respiratory distress.      Breath sounds: Normal breath sounds. No wheezing.   Abdominal:      General: Bowel sounds are normal. There is no distension or abdominal bruit.      Palpations: Abdomen is soft. Abdomen is not rigid. There is no mass.      Tenderness: There is no abdominal tenderness. There is no guarding or rebound. Negative signs include Coello's sign and McBurney's sign.   Skin:     General: Skin is warm and dry.      Coloration: Skin is not jaundiced or pale.      Findings: No erythema or rash.   Neurological:      Mental Status: He is alert and oriented to person, place, and time.   Psychiatric:         Behavior: Behavior normal.         Thought Content: Thought content normal.         Judgment: Judgment normal.         Assessment:       1. Intermittent diarrhea    2. Weight loss        Plan:       Intermittent diarrhea  -     Stool Exam-Ova,Cysts,Parasites; Future; Expected date: 05/13/2024  -     Fecal fat, qualitative; Future; Expected date: 05/13/2024  -     Giardia / Cryptosporidum, EIA; Future; Expected date: 05/13/2024  -     Occult blood x 1, stool; Future; Expected date: 05/13/2024  -     pH, stool; Future; Expected date: 05/13/2024  -     Rotavirus antigen, stool; Future; Expected date: 05/13/2024  -     WBC, Stool; Future; Expected date: 05/13/2024  -     Stool culture; Future; Expected date: 05/13/2024  -     Clostridium difficile EIA; Future; Expected date: 05/13/2024  -     Adenovirus Molecular Detection, PCR, Non-Blood Stool; Future; Expected date: 05/13/2024  -     IgA; Future; Expected date: 05/13/2024  -     Tissue Transglutaminase, IgA; Future; Expected date: 05/13/2024  -     Calprotectin, Stool; Future; Expected date: 05/13/2024  - recommend OTC probiotic, such as Florastor or Culturelle, taken as directed on packaging  - avoid lactose,  alcohol, & caffeine  - avoid known triggers  - recommended increase fiber in diet, especially soluble fiber since this can help bulk up the stool consistency and may help to slow down how fast the stool goes through the colon and can prevent diarrhea; Recommend high fiber diet (20-30 grams of fiber daily)/OTC fiber supplements daily as directed, such as Benefiber.  - discussed with patient that certain medications, such as metformin, may be contributing to symptoms. I recommend follow-up with provider who manages medication to discuss about possible alternative therapy, patient verbalized understanding  - Possible colonoscopy pending results of testing and if symptoms persist    Weight loss  - encouraged PO intake and daily calorie counts to ensure adequate nutrition is taken in, recommend at least 2,000 calories a day  - recommend nutritional drinks, such as Boost, Ensure or Glucerna, to supplement nutrition needs  - Possible CT scan/endoscopies pending results of testing and if symptoms persist    Follow up in about 1 month (around 6/13/2024), or if symptoms worsen or fail to improve.      If no improvement in symptoms or symptoms worsen, call/follow-up at clinic or go to ER.        28 minutes of total time spent on the encounter, which includes face to face time and non-face to face time preparing to see the patient (e.g., review of tests), Obtaining and/or reviewing separately obtained history, Documenting clinical information in the electronic or other health record, Independently interpreting results (not separately reported) and communicating results to the patient/family/caregiver, or Care coordination (not separately reported).

## 2024-05-13 NOTE — PATIENT INSTRUCTIONS
Uncertain Causes of Diarrhea (Adult)    Diarrhea is when stools are loose and watery. This can be caused by:  Viral infections  Bacterial infections  Food poisoning  Parasites  Irritable bowel syndrome (IBS)  Inflammatory bowel diseases such as ulcerative colitis, Crohn's disease, and celiac disease  Food intolerance, such as to lactose, the sugar found in milk and milk products  Reaction to medicines like antibiotics, laxatives, cancer drugs, and antacids  Along with diarrhea, you may also have:  Abdominal pain and cramping  Nausea and vomiting  Loss of bowel control  Fever and chills  Bloody stools  In some cases, antibiotics may help to treat diarrhea. You may have a stool sample test. This is done to see what is causing your diarrhea, and if antibiotics will help treat it. The results of a stool sample test may take up to 2 days. The healthcare provider may not give you antibiotics until he or she has the stool test results.  Diarrhea can cause dehydration. This is the loss of too much water and other fluids from the body. When this occurs, body fluid must be replaced. This can be done with oral rehydration solutions. Oral rehydration solutions are available at drugstores and grocery stores without a prescription.  Home care  Follow all instructions given by your healthcare provider. Rest at home for the next 24 hours, or until you feel better. Avoid caffeine, tobacco, and alcohol. These can make diarrhea, cramping, and pain worse.  If taking medicines:  Dont take over-the-counter diarrhea or nausea medicines unless your healthcare provider tells you to.  You may use acetaminophen or NSAID medicines like ibuprofen or naproxen to reduce pain and fever. Dont use these if you have chronic liver or kidney disease, or ever had a stomach ulcer or gastrointestinal bleeding. Don't use NSAID medicines if you are already taking one for another condition (like arthritis) or are on daily aspirin therapy (such as for heart  disease or after a stroke). Talk with your healthcare provider first.  If antibiotics were prescribed, be sure you take them until they are finished. Dont stop taking them even when you feel better. Antibiotics must be taken as a full course.  To prevent the spread of illness:  Remember that washing with soap and water and using alcohol-based  is the best way to prevent the spread of infection.  Clean the toilet after each use.  Wash your hands before eating.  Wash your hands before and after preparing food. Keep in mind that people with diarrhea or vomiting should not prepare food for others.  Wash your hands after using cutting boards, countertops, and knives that have been in contact with raw foods.  Wash and then peel fruits and vegetables.  Keep uncooked meats away from cooked and ready-to-eat foods.  Use a food thermometer when cooking. Cook poultry to at least 165°F (74°C). Cook ground meat (beef, veal, pork, lamb) to at least 160°F (71°C). Cook fresh beef, veal, lamb, and pork to at least 145°F (63°C).  Dont eat raw or undercooked eggs (poached or babar side up), poultry, meat, or unpasteurized milk and juices.  Food and drinks  The main goal while treating vomiting or diarrhea is to prevent dehydration. This is done by taking small amounts of liquids often.  Keep in mind that liquids are more important than food right now.  Drink only small amounts of liquids at a time.  Dont force yourself to eat, especially if you are having cramping, vomiting, or diarrhea. Dont eat large amounts at a time, even if you are hungry.  If you eat, avoid fatty, greasy, spicy, or fried foods.  Dont eat dairy foods or drink milk if you have diarrhea. These can make diarrhea worse.  During the first 24 hours you can try:  Oral rehydration solutions. Do not use sports drinks. They have too much sugar and not enough electrolytes.  Soft drinks without caffeine  Ginger ale  Water (plain or flavored)  Decaf tea or  coffee  Clear broth, consommé, or bouillon  Gelatin, popsicles, or frozen fruit juice bars  The second 24 hours, if you are feeling better, you can add:  Hot cereal, plain toast, bread, rolls, or crackers  Plain noodles, rice, mashed potatoes, chicken noodle soup, or rice soup  Unsweetened canned fruit (no pineapple)  Bananas  As you recover:  Limit fat intake to less than 15 grams per day. Dont eat margarine, butter, oils, mayonnaise, sauces, gravies, fried foods, peanut butter, meat, poultry, or fish.  Limit fiber. Dont eat raw or cooked vegetables, fresh fruits except bananas, or bran cereals.  Limit caffeine and chocolate.  Limit dairy.  Dont use spices or seasonings except salt.  Go back to your normal diet over time, as you feel better and your symptoms improve.  If the symptoms come back, go back to a simple diet or clear liquids.  Follow-up care  Follow up with your healthcare provider, or as advised. If a stool sample was taken or cultures were done, call the healthcare provider for the results as instructed.  Call 911  Call 911 if you have any of these symptoms:  Trouble breathing  Confusion  Extreme drowsiness or trouble walking  Loss of consciousness  Rapid heart rate  Chest pain  Stiff neck  Seizure  When to seek medical advice  Call your healthcare provider right away if any of these occur:  Abdominal pain that gets worse  Constant lower right abdominal pain  Continued vomiting and inability to keep liquids down  Diarrhea more than 5 times a day  Blood in vomit or stool  Dark urine or no urine for 8 hours, dry mouth and tongue, tiredness, weakness, or dizziness  Drowsiness  New rash  You dont get better in 2 to 3 days  Fever of 100.4°F (38°C) or higher that doesnt get lower with medicine  Date Last Reviewed: 1/3/2016  © 8236-4964 [employer]. [address] [place] [address], [place] [address]. All rights reserved. This information is not intended as a substitute for professional medical care.  Always follow your healthcare professional's instructions.

## 2024-05-21 ENCOUNTER — LAB VISIT (OUTPATIENT)
Dept: LAB | Facility: HOSPITAL | Age: 44
End: 2024-05-21
Attending: NURSE PRACTITIONER
Payer: COMMERCIAL

## 2024-05-21 DIAGNOSIS — R19.7 INTERMITTENT DIARRHEA: ICD-10-CM

## 2024-05-21 LAB
C DIFF GDH STL QL: NEGATIVE
C DIFF TOX A+B STL QL IA: NEGATIVE
WBC #/AREA STL HPF: NORMAL /[HPF]

## 2024-05-21 PROCEDURE — 87449 NOS EACH ORGANISM AG IA: CPT | Performed by: NURSE PRACTITIONER

## 2024-05-21 PROCEDURE — 87427 SHIGA-LIKE TOXIN AG IA: CPT | Mod: 59 | Performed by: NURSE PRACTITIONER

## 2024-05-21 PROCEDURE — 83993 ASSAY FOR CALPROTECTIN FECAL: CPT | Performed by: NURSE PRACTITIONER

## 2024-05-21 PROCEDURE — 87045 FECES CULTURE AEROBIC BACT: CPT | Performed by: NURSE PRACTITIONER

## 2024-05-21 PROCEDURE — 87046 STOOL CULTR AEROBIC BACT EA: CPT | Performed by: NURSE PRACTITIONER

## 2024-05-21 PROCEDURE — 87798 DETECT AGENT NOS DNA AMP: CPT | Performed by: NURSE PRACTITIONER

## 2024-05-21 PROCEDURE — 83986 ASSAY PH BODY FLUID NOS: CPT | Performed by: NURSE PRACTITIONER

## 2024-05-21 PROCEDURE — 87425 ROTAVIRUS AG IA: CPT | Performed by: NURSE PRACTITIONER

## 2024-05-21 PROCEDURE — 89055 LEUKOCYTE ASSESSMENT FECAL: CPT | Performed by: NURSE PRACTITIONER

## 2024-05-21 PROCEDURE — 87328 CRYPTOSPORIDIUM AG IA: CPT | Performed by: NURSE PRACTITIONER

## 2024-05-21 PROCEDURE — 82705 FATS/LIPIDS FECES QUAL: CPT | Performed by: NURSE PRACTITIONER

## 2024-05-21 PROCEDURE — 87449 NOS EACH ORGANISM AG IA: CPT | Mod: 91 | Performed by: NURSE PRACTITIONER

## 2024-05-21 PROCEDURE — 87324 CLOSTRIDIUM AG IA: CPT | Performed by: NURSE PRACTITIONER

## 2024-05-21 PROCEDURE — 82272 OCCULT BLD FECES 1-3 TESTS: CPT | Performed by: NURSE PRACTITIONER

## 2024-05-21 PROCEDURE — 87329 GIARDIA AG IA: CPT | Performed by: NURSE PRACTITIONER

## 2024-05-21 PROCEDURE — 87209 SMEAR COMPLEX STAIN: CPT | Performed by: NURSE PRACTITIONER

## 2024-05-22 LAB
CRYPTOSP AG STL QL IA: NEGATIVE
E COLI SXT1 STL QL IA: NEGATIVE
E COLI SXT2 STL QL IA: NEGATIVE
G LAMBLIA AG STL QL IA: NEGATIVE
OB PNL STL: NEGATIVE
RV AG STL QL IA.RAPID: NEGATIVE

## 2024-05-23 LAB
FAT STL QL: NORMAL
NEUTRAL FAT STL QL: NORMAL
PH STL: 6 [PH] (ref 5–8.5)

## 2024-05-24 LAB
BACTERIA STL CULT: NORMAL
CALPROTECTIN STL-MCNT: 587 MCG/G
O+P STL MICRO: NORMAL

## 2024-05-25 LAB
HADV DNA SERPL QL NAA+PROBE: NEGATIVE
SPECIMEN SOURCE: NORMAL

## 2024-05-28 ENCOUNTER — TELEPHONE (OUTPATIENT)
Dept: GASTROENTEROLOGY | Facility: CLINIC | Age: 44
End: 2024-05-28
Payer: COMMERCIAL

## 2024-05-28 NOTE — TELEPHONE ENCOUNTER
Please call to inform & review the results with the patient-stool studies showed elevated calprotectin level. This indicates inflammation in the intestines. Recommend scheduling colonoscopy to further evaluate this finding.  stool studies suggest acidic stool; otherwise, negative/normal results. Acidic stool can be seen in certain types of malabsorption such as lactose. Trial of dairy free or low dairy/low carb diet may be helpful.    Continue with previous recommendations. If no improvement in symptoms or symptoms worsen, call/follow-up at clinic or go to ER.    Thanks,